# Patient Record
Sex: MALE | Race: BLACK OR AFRICAN AMERICAN | HISPANIC OR LATINO | Employment: OTHER | URBAN - METROPOLITAN AREA
[De-identification: names, ages, dates, MRNs, and addresses within clinical notes are randomized per-mention and may not be internally consistent; named-entity substitution may affect disease eponyms.]

---

## 2021-06-02 ENCOUNTER — RECORDS - HEALTHEAST (OUTPATIENT)
Dept: ADMINISTRATIVE | Facility: CLINIC | Age: 61
End: 2021-06-02

## 2023-09-22 ENCOUNTER — HOSPITAL ENCOUNTER (OUTPATIENT)
Facility: HOSPITAL | Age: 63
Setting detail: OBSERVATION
Discharge: HOME | End: 2023-09-24
Attending: EMERGENCY MEDICINE | Admitting: INTERNAL MEDICINE
Payer: MEDICARE

## 2023-09-22 ENCOUNTER — APPOINTMENT (OUTPATIENT)
Dept: RADIOLOGY | Facility: HOSPITAL | Age: 63
End: 2023-09-22
Attending: EMERGENCY MEDICINE
Payer: MEDICARE

## 2023-09-22 ENCOUNTER — APPOINTMENT (OUTPATIENT)
Dept: CT IMAGING | Facility: HOSPITAL | Age: 63
End: 2023-09-22
Attending: EMERGENCY MEDICINE
Payer: MEDICARE

## 2023-09-22 DIAGNOSIS — S22.42XA CLOSED FRACTURE OF MULTIPLE RIBS OF LEFT SIDE, INITIAL ENCOUNTER: ICD-10-CM

## 2023-09-22 DIAGNOSIS — S60.012A CONTUSION OF LEFT THUMB WITHOUT DAMAGE TO NAIL, INITIAL ENCOUNTER: ICD-10-CM
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
## 2023-09-22 DIAGNOSIS — E11.69 TYPE 2 DIABETES MELLITUS WITH OTHER SPECIFIED COMPLICATION, WITHOUT LONG-TERM CURRENT USE OF INSULIN (H): Primary | ICD-10-CM

## 2023-09-22 LAB
ABO/RH(D): NORMAL
ANION GAP SERPL CALCULATED.3IONS-SCNC: 14 MMOL/L (ref 7–15)
ANTIBODY SCREEN: NEGATIVE
APTT PPP: 22 SECONDS (ref 22–38)
BASOPHILS # BLD AUTO: 0.1 10E3/UL (ref 0–0.2)
BASOPHILS NFR BLD AUTO: 1 %
BUN SERPL-MCNC: 22.9 MG/DL (ref 8–23)
CALCIUM SERPL-MCNC: 9.9 MG/DL (ref 8.8–10.2)
CHLORIDE SERPL-SCNC: 101 MMOL/L (ref 98–107)
CREAT SERPL-MCNC: 1.4 MG/DL (ref 0.67–1.17)
DEPRECATED HCO3 PLAS-SCNC: 22 MMOL/L (ref 22–29)
EGFRCR SERPLBLD CKD-EPI 2021: 56 ML/MIN/1.73M2
EOSINOPHIL # BLD AUTO: 0.1 10E3/UL (ref 0–0.7)
EOSINOPHIL NFR BLD AUTO: 1 %
ERYTHROCYTE [DISTWIDTH] IN BLOOD BY AUTOMATED COUNT: 13.3 % (ref 10–15)
GLUCOSE BLDC GLUCOMTR-MCNC: 165 MG/DL (ref 70–99)
GLUCOSE SERPL-MCNC: 140 MG/DL (ref 70–99)
HBA1C MFR BLD: 6.9 %
HCT VFR BLD AUTO: 42.5 % (ref 40–53)
HGB BLD-MCNC: 14.6 G/DL (ref 13.3–17.7)
IMM GRANULOCYTES # BLD: 0.1 10E3/UL
IMM GRANULOCYTES NFR BLD: 1 %
INR PPP: 1.04 (ref 0.85–1.15)
LACTATE SERPL-SCNC: 1.2 MMOL/L (ref 0.7–2)
LYMPHOCYTES # BLD AUTO: 1.9 10E3/UL (ref 0.8–5.3)
LYMPHOCYTES NFR BLD AUTO: 13 %
MAGNESIUM SERPL-MCNC: 1.7 MG/DL (ref 1.7–2.3)
MCH RBC QN AUTO: 29.1 PG (ref 26.5–33)
MCHC RBC AUTO-ENTMCNC: 34.4 G/DL (ref 31.5–36.5)
MCV RBC AUTO: 85 FL (ref 78–100)
MONOCYTES # BLD AUTO: 0.9 10E3/UL (ref 0–1.3)
MONOCYTES NFR BLD AUTO: 6 %
NEUTROPHILS # BLD AUTO: 11.1 10E3/UL (ref 1.6–8.3)
NEUTROPHILS NFR BLD AUTO: 78 %
NRBC # BLD AUTO: 0 10E3/UL
NRBC BLD AUTO-RTO: 0 /100
PLATELET # BLD AUTO: 288 10E3/UL (ref 150–450)
POTASSIUM SERPL-SCNC: 4.1 MMOL/L (ref 3.4–5.3)
RBC # BLD AUTO: 5.02 10E6/UL (ref 4.4–5.9)
SODIUM SERPL-SCNC: 137 MMOL/L (ref 136–145)
SPECIMEN EXPIRATION DATE: NORMAL
WBC # BLD AUTO: 14.1 10E3/UL (ref 4–11)

## 2023-09-22 PROCEDURE — G0378 HOSPITAL OBSERVATION PER HR: HCPCS

## 2023-09-22 PROCEDURE — 82962 GLUCOSE BLOOD TEST: CPT

## 2023-09-22 PROCEDURE — 72125 CT NECK SPINE W/O DYE: CPT | Mod: MF

## 2023-09-22 PROCEDURE — 96375 TX/PRO/DX INJ NEW DRUG ADDON: CPT | Mod: 59

## 2023-09-22 PROCEDURE — 73140 X-RAY EXAM OF FINGER(S): CPT | Mod: LT

## 2023-09-22 PROCEDURE — 99222 1ST HOSP IP/OBS MODERATE 55: CPT | Mod: AI | Performed by: INTERNAL MEDICINE

## 2023-09-22 PROCEDURE — 258N000003 HC RX IP 258 OP 636: Performed by: INTERNAL MEDICINE

## 2023-09-22 PROCEDURE — 250N000011 HC RX IP 250 OP 636: Mod: JZ | Performed by: INTERNAL MEDICINE

## 2023-09-22 PROCEDURE — 250N000011 HC RX IP 250 OP 636: Performed by: EMERGENCY MEDICINE

## 2023-09-22 PROCEDURE — 96374 THER/PROPH/DIAG INJ IV PUSH: CPT

## 2023-09-22 PROCEDURE — 83605 ASSAY OF LACTIC ACID: CPT | Performed by: INTERNAL MEDICINE

## 2023-09-22 PROCEDURE — 250N000012 HC RX MED GY IP 250 OP 636 PS 637: Performed by: INTERNAL MEDICINE

## 2023-09-22 PROCEDURE — 71046 X-RAY EXAM CHEST 2 VIEWS: CPT

## 2023-09-22 PROCEDURE — 86901 BLOOD TYPING SEROLOGIC RH(D): CPT | Performed by: EMERGENCY MEDICINE

## 2023-09-22 PROCEDURE — 85730 THROMBOPLASTIN TIME PARTIAL: CPT | Performed by: EMERGENCY MEDICINE

## 2023-09-22 PROCEDURE — 85610 PROTHROMBIN TIME: CPT | Performed by: EMERGENCY MEDICINE

## 2023-09-22 PROCEDURE — 80048 BASIC METABOLIC PNL TOTAL CA: CPT | Performed by: EMERGENCY MEDICINE

## 2023-09-22 PROCEDURE — 36415 COLL VENOUS BLD VENIPUNCTURE: CPT | Performed by: EMERGENCY MEDICINE

## 2023-09-22 PROCEDURE — G1010 CDSM STANSON: HCPCS

## 2023-09-22 PROCEDURE — 250N000011 HC RX IP 250 OP 636: Mod: JZ | Performed by: EMERGENCY MEDICINE

## 2023-09-22 PROCEDURE — 36415 COLL VENOUS BLD VENIPUNCTURE: CPT | Performed by: INTERNAL MEDICINE

## 2023-09-22 PROCEDURE — 83735 ASSAY OF MAGNESIUM: CPT | Performed by: EMERGENCY MEDICINE

## 2023-09-22 PROCEDURE — 250N000013 HC RX MED GY IP 250 OP 250 PS 637: Performed by: STUDENT IN AN ORGANIZED HEALTH CARE EDUCATION/TRAINING PROGRAM

## 2023-09-22 PROCEDURE — 74177 CT ABD & PELVIS W/CONTRAST: CPT | Mod: MG

## 2023-09-22 PROCEDURE — 85004 AUTOMATED DIFF WBC COUNT: CPT | Performed by: EMERGENCY MEDICINE

## 2023-09-22 PROCEDURE — 99285 EMERGENCY DEPT VISIT HI MDM: CPT | Mod: 25

## 2023-09-22 PROCEDURE — 250N000013 HC RX MED GY IP 250 OP 250 PS 637: Performed by: INTERNAL MEDICINE

## 2023-09-22 PROCEDURE — 83036 HEMOGLOBIN GLYCOSYLATED A1C: CPT | Performed by: INTERNAL MEDICINE

## 2023-09-22 RX ORDER — OXYBUTYNIN CHLORIDE 15 MG/1
15 TABLET, EXTENDED RELEASE ORAL DAILY
COMMUNITY
Start: 2023-09-01

## 2023-09-22 RX ORDER — POLYETHYLENE GLYCOL 3350 17 G/17G
17 POWDER, FOR SOLUTION ORAL DAILY PRN
Status: DISCONTINUED | OUTPATIENT
Start: 2023-09-22 | End: 2023-09-24 | Stop reason: HOSPADM

## 2023-09-22 RX ORDER — IOPAMIDOL 755 MG/ML
75 INJECTION, SOLUTION INTRAVASCULAR ONCE
Status: COMPLETED | OUTPATIENT
Start: 2023-09-22 | End: 2023-09-22

## 2023-09-22 RX ORDER — HYDRALAZINE HYDROCHLORIDE 20 MG/ML
10 INJECTION INTRAMUSCULAR; INTRAVENOUS EVERY 6 HOURS PRN
Status: DISCONTINUED | OUTPATIENT
Start: 2023-09-22 | End: 2023-09-24 | Stop reason: HOSPADM

## 2023-09-22 RX ORDER — DEXTROSE MONOHYDRATE 25 G/50ML
25-50 INJECTION, SOLUTION INTRAVENOUS
Status: DISCONTINUED | OUTPATIENT
Start: 2023-09-22 | End: 2023-09-24 | Stop reason: HOSPADM

## 2023-09-22 RX ORDER — METFORMIN HCL 500 MG
1000 TABLET, EXTENDED RELEASE 24 HR ORAL 2 TIMES DAILY WITH MEALS
Status: ON HOLD | COMMUNITY
Start: 2023-09-01 | End: 2023-09-24

## 2023-09-22 RX ORDER — HYDROMORPHONE HYDROCHLORIDE 1 MG/ML
0.5 INJECTION, SOLUTION INTRAMUSCULAR; INTRAVENOUS; SUBCUTANEOUS ONCE
Status: COMPLETED | OUTPATIENT
Start: 2023-09-22 | End: 2023-09-22

## 2023-09-22 RX ORDER — ONDANSETRON 4 MG/1
4 TABLET, ORALLY DISINTEGRATING ORAL EVERY 6 HOURS PRN
Status: DISCONTINUED | OUTPATIENT
Start: 2023-09-22 | End: 2023-09-24 | Stop reason: HOSPADM

## 2023-09-22 RX ORDER — ACETAMINOPHEN 325 MG/1
975 TABLET ORAL EVERY 8 HOURS
Status: DISCONTINUED | OUTPATIENT
Start: 2023-09-23 | End: 2023-09-24 | Stop reason: HOSPADM

## 2023-09-22 RX ORDER — PROCHLORPERAZINE 25 MG
25 SUPPOSITORY, RECTAL RECTAL EVERY 12 HOURS PRN
Status: DISCONTINUED | OUTPATIENT
Start: 2023-09-22 | End: 2023-09-24 | Stop reason: HOSPADM

## 2023-09-22 RX ORDER — MORPHINE SULFATE 4 MG/ML
4 INJECTION, SOLUTION INTRAMUSCULAR; INTRAVENOUS ONCE
Status: COMPLETED | OUTPATIENT
Start: 2023-09-22 | End: 2023-09-22

## 2023-09-22 RX ORDER — ACETAMINOPHEN 325 MG/1
325 TABLET ORAL ONCE
Status: DISCONTINUED | OUTPATIENT
Start: 2023-09-22 | End: 2023-09-22

## 2023-09-22 RX ORDER — AMOXICILLIN 250 MG
1 CAPSULE ORAL 2 TIMES DAILY
Status: DISCONTINUED | OUTPATIENT
Start: 2023-09-22 | End: 2023-09-24 | Stop reason: HOSPADM

## 2023-09-22 RX ORDER — ACETAMINOPHEN 500 MG
500-1000 TABLET ORAL EVERY 8 HOURS PRN
COMMUNITY
Start: 2022-08-19

## 2023-09-22 RX ORDER — ONDANSETRON 2 MG/ML
4 INJECTION INTRAMUSCULAR; INTRAVENOUS EVERY 6 HOURS PRN
Status: DISCONTINUED | OUTPATIENT
Start: 2023-09-22 | End: 2023-09-24 | Stop reason: HOSPADM

## 2023-09-22 RX ORDER — NICOTINE POLACRILEX 4 MG
15-30 LOZENGE BUCCAL
Status: DISCONTINUED | OUTPATIENT
Start: 2023-09-22 | End: 2023-09-24 | Stop reason: HOSPADM

## 2023-09-22 RX ORDER — BISACODYL 10 MG
10 SUPPOSITORY, RECTAL RECTAL DAILY PRN
Status: DISCONTINUED | OUTPATIENT
Start: 2023-09-22 | End: 2023-09-24 | Stop reason: HOSPADM

## 2023-09-22 RX ORDER — LISINOPRIL 5 MG/1
1 TABLET ORAL DAILY
Status: ON HOLD | COMMUNITY
Start: 2023-09-01 | End: 2023-09-24

## 2023-09-22 RX ORDER — IBUPROFEN 600 MG/1
600 TABLET, FILM COATED ORAL ONCE
Status: DISCONTINUED | OUTPATIENT
Start: 2023-09-22 | End: 2023-09-22

## 2023-09-22 RX ORDER — ACETAMINOPHEN 325 MG/1
975 TABLET ORAL ONCE
Status: COMPLETED | OUTPATIENT
Start: 2023-09-22 | End: 2023-09-22

## 2023-09-22 RX ORDER — AMOXICILLIN 250 MG
2 CAPSULE ORAL 2 TIMES DAILY
Status: DISCONTINUED | OUTPATIENT
Start: 2023-09-22 | End: 2023-09-24 | Stop reason: HOSPADM

## 2023-09-22 RX ORDER — LIDOCAINE 4 G/G
1 PATCH TOPICAL
Status: DISCONTINUED | OUTPATIENT
Start: 2023-09-22 | End: 2023-09-23

## 2023-09-22 RX ORDER — ROSUVASTATIN CALCIUM 5 MG/1
5 TABLET, COATED ORAL
COMMUNITY
Start: 2023-09-01

## 2023-09-22 RX ORDER — SODIUM CHLORIDE 9 MG/ML
INJECTION, SOLUTION INTRAVENOUS CONTINUOUS
Status: DISCONTINUED | OUTPATIENT
Start: 2023-09-22 | End: 2023-09-23

## 2023-09-22 RX ORDER — PROCHLORPERAZINE MALEATE 10 MG
10 TABLET ORAL EVERY 6 HOURS PRN
Status: DISCONTINUED | OUTPATIENT
Start: 2023-09-22 | End: 2023-09-24 | Stop reason: HOSPADM

## 2023-09-22 RX ORDER — ALBUTEROL SULFATE 90 UG/1
1-2 AEROSOL, METERED RESPIRATORY (INHALATION) EVERY 4 HOURS PRN
COMMUNITY
Start: 2021-12-17

## 2023-09-22 RX ADMIN — SODIUM CHLORIDE, PRESERVATIVE FREE: 5 INJECTION INTRAVENOUS at 21:07

## 2023-09-22 RX ADMIN — LIDOCAINE 1 PATCH: 4 PATCH TOPICAL at 22:51

## 2023-09-22 RX ADMIN — IOPAMIDOL 75 ML: 755 INJECTION, SOLUTION INTRAVENOUS at 18:38

## 2023-09-22 RX ADMIN — OXYCODONE HYDROCHLORIDE 2.5 MG: 5 TABLET ORAL at 22:26

## 2023-09-22 RX ADMIN — ACETAMINOPHEN 975 MG: 325 TABLET ORAL at 16:30

## 2023-09-22 RX ADMIN — ONDANSETRON 4 MG: 2 INJECTION INTRAMUSCULAR; INTRAVENOUS at 21:08

## 2023-09-22 RX ADMIN — MORPHINE SULFATE 4 MG: 4 INJECTION, SOLUTION INTRAMUSCULAR; INTRAVENOUS at 17:27

## 2023-09-22 RX ADMIN — HYDROMORPHONE HYDROCHLORIDE 0.5 MG: 1 INJECTION, SOLUTION INTRAMUSCULAR; INTRAVENOUS; SUBCUTANEOUS at 18:50

## 2023-09-22 RX ADMIN — INSULIN GLARGINE 10 UNITS: 100 INJECTION, SOLUTION SUBCUTANEOUS at 22:53

## 2023-09-22 ASSESSMENT — ACTIVITIES OF DAILY LIVING (ADL)
ADLS_ACUITY_SCORE: 36
ADLS_ACUITY_SCORE: 35

## 2023-09-22 NOTE — ED PROVIDER NOTES
EMERGENCY DEPARTMENT ENCOUNTER      NAME: Josias Khan  AGE: 62 year old male  YOB: 1960  MRN: [id number]  EVALUATION DATE & TIME: 9/22/2023  4:37 PM    PCP: John Gonzales    ED PROVIDER: Jenny Skinner M.D.      Chief Complaint   Patient presents with    Fall     FINAL IMPRESSION:  1. Closed fracture of multiple ribs of left side, initial encounter    2. Fall, initial encounter    3. Contusion of left thumb without damage to nail, initial encounter      ED COURSE & MEDICAL DECISION MAKING:    Pertinent Labs & Imaging studies reviewed. (See chart for details)  ED Course as of 09/22/23 2226   Fri Sep 22, 2023   1718 Patient is a 62-year-old male comes in today for evaluation after a fall.  He was standing on a ladder and fell and landed on the left side against a countertop.  He has some pain with inspiration.  He is quite tender the left lateral ribs and to the left side of the abdomen.  He was initially seen sent for a chest x-ray before my evaluation but after evaluating him I think he needs to go down for CT of his chest and abdomen.  He is also complaining of some neck pain we will get a CT scan of his neck.  He did not think that he hit his head.  He would like something stronger for pain.  He already got some Tylenol.  I have ordered some morphine.  We will get some basic labs and then get him down for imaging.  He is not on any blood thinners but I am concerned about a traumatic injury.  Him and family are in agreement with the plan.   1723 Patient has some swelling and bruising to his left thumb as well so I have ordered an x-ray of that.   1920 Mildly displaced fractures of ribs 8 through 12.   1935 I discussed the results with the patient.  He would like admission to the hospital for pain control which I think is very reasonable given the number of rib fractures.  I will put a call out to the hospitalist.   1958 Spoke with Dr. Ji with the hospitalist service.  He agreed with plan  for a [facility] observation admission for 5 rib fractures.     5:15 PM I introduced myself to the patient, obtained patient history, performed a physical exam, and discussed plan for ED workup including potential diagnostic laboratory/imaging studies and interventions.  7:32 PM I rechecked and updated the patient.  7:36 PM I paged the hospitalist.  7:57 PM I spoke with the hospitalist, Dr. Ji, who has accepted the patient.    Medical Decision Making    History:  Supplemental history from: Patient's family   External Record(s) reviewed: None    Work Up:  Emergent/Severe conditions considered and evaluated for: Splint rib fractures, pneumothorax, hemothorax, splenic laceration, renal laceration, intra-abdominal hemorrhage  I independently reviewed and interpreted CT scan of the abdomen pelvis which did not show any significant splenic laceration.  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   All questions were answered.  The patient acknowledged understanding and was involved in the decision making regarding the overall care plan.      MEDICATIONS GIVEN IN THE EMERGENCY:  Medications   melatonin tablet 1 mg (has no administration in time range)   ondansetron (ZOFRAN ODT) ODT tab 4 mg ( Oral See Alternative 9/22/23 4864)     Or   ondansetron (ZOFRAN) injection 4 mg (4 mg Intravenous $Given  9/22/23 2108)   glucose gel 15-30 g (has no administration in time range)     Or   dextrose 50 % injection 25-50 mL (has no administration in time range)     Or   glucagon injection 1 mg (has no administration in time range)   acetaminophen (TYLENOL) tablet 975 mg (has no administration in time range)   oxyCODONE IR (ROXICODONE) half-tab 2.5 mg (has no administration in time range)   senna-docusate (SENOKOT-S/PERICOLACE) 8.6-50 MG per tablet 1 tablet (has no administration in time range)     Or   senna-docusate (SENOKOT-S/PERICOLACE) 8.6-50 MG per tablet 2 tablet (has no administration in time range)   polyethylene glycol (MIRALAX) Packet 17 g (has no administration in time range)   bisacodyl (DULCOLAX) suppository 10 mg (has no administration in time range)   insulin aspart (NovoLOG) injection (RAPID ACTING) (has no administration in time range)   insulin aspart (NovoLOG) injection (RAPID ACTING) (has no administration in time range)   prochlorperazine (COMPAZINE) injection 10 mg (has no administration in time range)     Or   prochlorperazine (COMPAZINE) tablet 10 mg (has no administration in time range)     Or   prochlorperazine (COMPAZINE) suppository 25 mg (has no administration in time range)   insulin glargine (LANTUS PEN) injection 10 Units (has no administration in time range)   Lidocaine (LIDOCARE) 4 % Patch 1 patch (has no administration in time range)   sodium chloride 0.9% infusion ( Intravenous $New Bag 9/22/23 2107)   hydrALAZINE (APRESOLINE) injection 10 mg (has no administration in time range)   acetaminophen (TYLENOL) tablet 975 mg (975 mg Oral $Given 9/22/23 1630)   morphine (PF) injection 4 mg (4 mg Intravenous $Given 9/22/23 1727)   iopamidol (ISOVUE-370) solution 75 mL (75 mLs Intravenous $Given 9/22/23 1838)   HYDROmorphone (PF) (DILAUDID) injection 0.5 mg (0.5 mg Intravenous $Given 9/22/23 1850)     =================================================================    HPI    Triage Note:  Patient Khmer speaking, here with daughter who is helping with language. Patient on ladder in kitchen, 3.5 feet up and trying to step over to counter, ladder fell over and he hit left abdomin on counter and fell to floor. No LOC, no head injury no blood thinner. Incident happen one hour ago.    Patient information was obtained from: Patient, Patient's family    Use of : Patient's family served as Azeri interpretor        Josias Khan is a 62 year old male who presents for evaluation of a fall. Patient had fallen off a ladder 4 ft above the ground and hit his ribs on the counter. Patient reports pain with breathing. Patient reports neck pain. His left thumb is bruised. Patient took tylenol because he can't take ibuprofen. He has type 2 diabetes. He is not on blood thinners.     PAST MEDICAL HISTORY:  No past medical history on file.    PAST SURGICAL HISTORY:  No past surgical history on file.    CURRENT MEDICATIONS:      Current Facility-Administered Medications:     [START ON 9/23/2023] acetaminophen (TYLENOL) tablet 975 mg, 975 mg, Oral, Q8H, Isidro Ji MD    bisacodyl (DULCOLAX) suppository 10 mg, 10 mg, Rectal, Daily PRN, Isidro Ji MD    glucose gel 15-30 g, 15-30 g, Oral, Q15 Min PRN **OR** dextrose 50 % injection 25-50 mL, 25-50 mL, Intravenous, Q15 Min PRN **OR** glucagon injection 1 mg, 1 mg, Subcutaneous, Q15 Min PRN, Isidro Ji MD    hydrALAZINE (APRESOLINE) injection 10 mg, 10 mg, Intravenous, Q6H PRN, Isidro Ji MD    [START ON 9/23/2023] insulin aspart (NovoLOG) injection (RAPID ACTING), 1-7 Units, Subcutaneous, TID AC, Isidro Ji MD    insulin aspart (NovoLOG) injection (RAPID ACTING), 1-5 Units, Subcutaneous, At Bedtime, Isidro Ji MD    insulin glargine (LANTUS PEN) injection 10 Units, 10 Units, Subcutaneous, At Bedtime, Isidro Ji MD    Lidocaine (LIDOCARE) 4 % Patch 1 patch, 1 patch, Transdermal, Q24h, Isidro Ji MD    melatonin tablet 1  "mg, 1 mg, Oral, At Bedtime PRN, Isidro Ji MD    ondansetron (ZOFRAN ODT) ODT tab 4 mg, 4 mg, Oral, Q6H PRN **OR** ondansetron (ZOFRAN) injection 4 mg, 4 mg, Intravenous, Q6H PRN, Isidro Ji MD, 4 mg at 09/22/23 2108    oxyCODONE IR (ROXICODONE) half-tab 2.5 mg, 2.5 mg, Oral, Q4H PRN, Isidro Ji MD    polyethylene glycol (MIRALAX) Packet 17 g, 17 g, Oral, Daily PRN, Isidro Ji MD    prochlorperazine (COMPAZINE) injection 10 mg, 10 mg, Intravenous, Q6H PRN **OR** prochlorperazine (COMPAZINE) tablet 10 mg, 10 mg, Oral, Q6H PRN **OR** prochlorperazine (COMPAZINE) suppository 25 mg, 25 mg, Rectal, Q12H PRN, Isidro Ji MD    senna-docusate (SENOKOT-S/PERICOLACE) 8.6-50 MG per tablet 1 tablet, 1 tablet, Oral, BID **OR** senna-docusate (SENOKOT-S/PERICOLACE) 8.6-50 MG per tablet 2 tablet, 2 tablet, Oral, BID, Isidro Ji MD    sodium chloride 0.9% infusion, , Intravenous, Continuous, Isidro Ji MD, Last Rate: 100 mL/hr at 09/22/23 2107, New Bag at 09/22/23 2107    ALLERGIES:  Allergies   Allergen Reactions    Ibuprofen Other (See Comments)     Pt reports advised to avoid by pcp r/t long term use leading to kidney impairment.       FAMILY HISTORY:  No family history on file.    SOCIAL HISTORY:   Social History     Socioeconomic History    Marital status:        PHYSICAL EXAM    VITAL SIGNS: BP (!) 142/82 (BP Location: Left arm)   Pulse 57   Temp 98  F (36.7  C) (Oral)   Resp 18   Ht 1.727 m (5' 8\")   Wt 84.9 kg (187 lb 2.7 oz)   SpO2 93%   BMI 28.46 kg/m     GENERAL: Awake, alert, answering questions appropriately, Appears uncomfortable  SPEECH:  Easy to understand speech, Normal volume and kylie  PULMONARY: No respiratory distress, Lungs clear to auscultation bilaterally  CARDIOVASCULAR: Regular rate and rhythm, Distal pulses present and normal.  ABDOMINAL: Soft, Nondistended, No rebound or guarding, No palpable masses, Tenderness to left-sided abdomen, Tenderness to left " lateral ribs, Tenderness to cervical spine.  EXTREMITIES: No lower extremity edema, Swelling and bruising to his left thumb  PSYCH: Normal mood and affect     LAB:  All pertinent labs reviewed and interpreted.  Results for orders placed or performed during the hospital encounter of 09/22/23   XR Chest 2 Views    Impression    IMPRESSION: Shallow inspiration. No focal pulmonary consolidation. No pleural effusion. No pneumothorax. Heart size and pulmonary vascularity within normal limits.    Cervical spine CT w/o contrast    Impression    IMPRESSION:  1.  No fracture or posttraumatic subluxation.     CT Chest/Abdomen/Pelvis w Contrast    Impression    IMPRESSION:  1.  Mildly displaced fractures of the left 8th through 12th ribs. No significant pleural effusion or pneumothorax.  2.  No evidence of acute trauma in the abdomen or pelvis.  3.  Hepatic steatosis.   XR Finger Left G/E 2 Views    Impression    IMPRESSION: The left thumb is negative for fracture or dislocation. Mild degenerative change at the IP joint of the left thumb.     Result Value Ref Range    INR 1.04 0.85 - 1.15   Partial thromboplastin time   Result Value Ref Range    aPTT 22 22 - 38 Seconds   Basic metabolic panel   Result Value Ref Range    Sodium 137 136 - 145 mmol/L    Potassium 4.1 3.4 - 5.3 mmol/L    Chloride 101 98 - 107 mmol/L    Carbon Dioxide (CO2) 22 22 - 29 mmol/L    Anion Gap 14 7 - 15 mmol/L    Urea Nitrogen 22.9 8.0 - 23.0 mg/dL    Creatinine 1.40 (H) 0.67 - 1.17 mg/dL    Calcium 9.9 8.8 - 10.2 mg/dL    Glucose 140 (H) 70 - 99 mg/dL    GFR Estimate 56 (L) >60 mL/min/1.73m2   Result Value Ref Range    Magnesium 1.7 1.7 - 2.3 mg/dL   CBC with platelets and differential   Result Value Ref Range    WBC Count 14.1 (H) 4.0 - 11.0 10e3/uL    RBC Count 5.02 4.40 - 5.90 10e6/uL    Hemoglobin 14.6 13.3 - 17.7 g/dL    Hematocrit 42.5 40.0 - 53.0 %    MCV 85 78 - 100 fL    MCH 29.1 26.5 - 33.0 pg    MCHC 34.4 31.5 - 36.5 g/dL    RDW 13.3 10.0 -  15.0 %    Platelet Count 288 150 - 450 10e3/uL    % Neutrophils 78 %    % Lymphocytes 13 %    % Monocytes 6 %    % Eosinophils 1 %    % Basophils 1 %    % Immature Granulocytes 1 %    NRBCs per 100 WBC 0 <1 /100    Absolute Neutrophils 11.1 (H) 1.6 - 8.3 10e3/uL    Absolute Lymphocytes 1.9 0.8 - 5.3 10e3/uL    Absolute Monocytes 0.9 0.0 - 1.3 10e3/uL    Absolute Eosinophils 0.1 0.0 - 0.7 10e3/uL    Absolute Basophils 0.1 0.0 - 0.2 10e3/uL    Absolute Immature Granulocytes 0.1 <=0.4 10e3/uL    Absolute NRBCs 0.0 10e3/uL   Result Value Ref Range    Hemoglobin A1C 6.9 (H) <5.7 %   Lactic acid whole blood   Result Value Ref Range    Lactic Acid 1.2 0.7 - 2.0 mmol/L   Adult Type and Screen   Result Value Ref Range    ABO/RH(D) A POS     Antibody Screen Negative Negative    SPECIMEN EXPIRATION DATE [id number]        RADIOLOGY:  XR Finger Left G/E 2 Views   Final Result   IMPRESSION: The left thumb is negative for fracture or dislocation. Mild degenerative change at the IP joint of the left thumb.         Cervical spine CT w/o contrast   Final Result   IMPRESSION:   1.  No fracture or posttraumatic subluxation.         CT Chest/Abdomen/Pelvis w Contrast   Final Result   IMPRESSION:   1.  Mildly displaced fractures of the left 8th through 12th ribs. No significant pleural effusion or pneumothorax.   2.  No evidence of acute trauma in the abdomen or pelvis.   3.  Hepatic steatosis.      XR Chest 2 Views   Final Result   IMPRESSION: Shallow inspiration. No focal pulmonary consolidation. No pleural effusion. No pneumothorax. Heart size and pulmonary vascularity within normal limits.             I, Lin Fletcher, am serving as a scribe to document services personally performed by Dr. Skinner based on my observation and the provider's statements to me. I, Jenny Skinner MD attest that Lin Fletcher is acting in a scribe capacity, has observed my performance of the services and has documented them in accordance with my  direction.    Jenny Skinner M.D.  Emergency Medicine  [facility] EMERGENCY DEPARTMENT  [facility] [facility] [id number] [phone]  Dept: [phone]       Jenny Skinner MD  09/22/23 1576

## 2023-09-22 NOTE — ED TRIAGE NOTES
Patient Armenian speaking, here with daughter who is helping with language. Patient on ladder in kitchen, 3.5 feet up and trying to step over to counter, ladder fell over and he hit left abdomin on counter and fell to floor. No LOC, no head injury no blood thinner. Incident happen one hour ago.

## 2023-09-23 ENCOUNTER — APPOINTMENT (OUTPATIENT)
Dept: PHYSICAL THERAPY | Facility: HOSPITAL | Age: 63
End: 2023-09-23
Attending: INTERNAL MEDICINE
Payer: MEDICARE

## 2023-09-23 LAB
ANION GAP SERPL CALCULATED.3IONS-SCNC: 10 MMOL/L (ref 7–15)
BUN SERPL-MCNC: 18.3 MG/DL (ref 8–23)
CALCIUM SERPL-MCNC: 8.5 MG/DL (ref 8.8–10.2)
CHLORIDE SERPL-SCNC: 104 MMOL/L (ref 98–107)
CK SERPL-CCNC: 322 U/L (ref 39–308)
CREAT SERPL-MCNC: 1.13 MG/DL (ref 0.67–1.17)
DEPRECATED HCO3 PLAS-SCNC: 23 MMOL/L (ref 22–29)
EGFRCR SERPLBLD CKD-EPI 2021: 73 ML/MIN/1.73M2
ERYTHROCYTE [DISTWIDTH] IN BLOOD BY AUTOMATED COUNT: 13.4 % (ref 10–15)
GLUCOSE BLDC GLUCOMTR-MCNC: 109 MG/DL (ref 70–99)
GLUCOSE BLDC GLUCOMTR-MCNC: 113 MG/DL (ref 70–99)
GLUCOSE BLDC GLUCOMTR-MCNC: 117 MG/DL (ref 70–99)
GLUCOSE BLDC GLUCOMTR-MCNC: 125 MG/DL (ref 70–99)
GLUCOSE BLDC GLUCOMTR-MCNC: 127 MG/DL (ref 70–99)
GLUCOSE SERPL-MCNC: 114 MG/DL (ref 70–99)
HCT VFR BLD AUTO: 38.4 % (ref 40–53)
HGB BLD-MCNC: 13.4 G/DL (ref 13.3–17.7)
MCH RBC QN AUTO: 29.3 PG (ref 26.5–33)
MCHC RBC AUTO-ENTMCNC: 34.9 G/DL (ref 31.5–36.5)
MCV RBC AUTO: 84 FL (ref 78–100)
PLATELET # BLD AUTO: 270 10E3/UL (ref 150–450)
POTASSIUM SERPL-SCNC: 4 MMOL/L (ref 3.4–5.3)
RBC # BLD AUTO: 4.57 10E6/UL (ref 4.4–5.9)
SODIUM SERPL-SCNC: 137 MMOL/L (ref 136–145)
WBC # BLD AUTO: 9 10E3/UL (ref 4–11)

## 2023-09-23 PROCEDURE — 250N000013 HC RX MED GY IP 250 OP 250 PS 637: Performed by: INTERNAL MEDICINE

## 2023-09-23 PROCEDURE — 97116 GAIT TRAINING THERAPY: CPT | Mod: GP

## 2023-09-23 PROCEDURE — 82962 GLUCOSE BLOOD TEST: CPT

## 2023-09-23 PROCEDURE — 99207 PR NO BILLABLE SERVICE THIS VISIT: CPT | Performed by: PHYSICIAN ASSISTANT

## 2023-09-23 PROCEDURE — 250N000009 HC RX 250: Performed by: INTERNAL MEDICINE

## 2023-09-23 PROCEDURE — 97161 PT EVAL LOW COMPLEX 20 MIN: CPT | Mod: GP

## 2023-09-23 PROCEDURE — G0378 HOSPITAL OBSERVATION PER HR: HCPCS

## 2023-09-23 PROCEDURE — 99232 SBSQ HOSP IP/OBS MODERATE 35: CPT | Performed by: INTERNAL MEDICINE

## 2023-09-23 PROCEDURE — 36415 COLL VENOUS BLD VENIPUNCTURE: CPT | Performed by: INTERNAL MEDICINE

## 2023-09-23 PROCEDURE — 82550 ASSAY OF CK (CPK): CPT | Performed by: INTERNAL MEDICINE

## 2023-09-23 PROCEDURE — 80048 BASIC METABOLIC PNL TOTAL CA: CPT | Performed by: INTERNAL MEDICINE

## 2023-09-23 PROCEDURE — 85027 COMPLETE CBC AUTOMATED: CPT | Performed by: INTERNAL MEDICINE

## 2023-09-23 RX ORDER — LISINOPRIL 5 MG/1
5 TABLET ORAL DAILY
Status: DISCONTINUED | OUTPATIENT
Start: 2023-09-23 | End: 2023-09-24 | Stop reason: HOSPADM

## 2023-09-23 RX ORDER — NALOXONE HYDROCHLORIDE 0.4 MG/ML
0.4 INJECTION, SOLUTION INTRAMUSCULAR; INTRAVENOUS; SUBCUTANEOUS
Status: DISCONTINUED | OUTPATIENT
Start: 2023-09-23 | End: 2023-09-24 | Stop reason: HOSPADM

## 2023-09-23 RX ORDER — NALOXONE HYDROCHLORIDE 0.4 MG/ML
0.2 INJECTION, SOLUTION INTRAMUSCULAR; INTRAVENOUS; SUBCUTANEOUS
Status: DISCONTINUED | OUTPATIENT
Start: 2023-09-23 | End: 2023-09-24 | Stop reason: HOSPADM

## 2023-09-23 RX ORDER — LIDOCAINE 50 MG/G
OINTMENT TOPICAL 4 TIMES DAILY
Status: DISCONTINUED | OUTPATIENT
Start: 2023-09-23 | End: 2023-09-24 | Stop reason: HOSPADM

## 2023-09-23 RX ORDER — ALBUTEROL SULFATE 90 UG/1
1-2 AEROSOL, METERED RESPIRATORY (INHALATION) EVERY 4 HOURS PRN
Status: DISCONTINUED | OUTPATIENT
Start: 2023-09-23 | End: 2023-09-24 | Stop reason: HOSPADM

## 2023-09-23 RX ADMIN — LIDOCAINE: 50 OINTMENT TOPICAL at 17:46

## 2023-09-23 RX ADMIN — LIDOCAINE: 50 OINTMENT TOPICAL at 13:11

## 2023-09-23 RX ADMIN — LIDOCAINE: 50 OINTMENT TOPICAL at 10:31

## 2023-09-23 RX ADMIN — LIDOCAINE: 50 OINTMENT TOPICAL at 21:42

## 2023-09-23 RX ADMIN — ACETAMINOPHEN 975 MG: 325 TABLET ORAL at 08:24

## 2023-09-23 RX ADMIN — ACETAMINOPHEN 975 MG: 325 TABLET ORAL at 01:23

## 2023-09-23 RX ADMIN — OXYCODONE HYDROCHLORIDE 2.5 MG: 5 TABLET ORAL at 21:41

## 2023-09-23 RX ADMIN — SENNOSIDES AND DOCUSATE SODIUM 1 TABLET: 50; 8.6 TABLET ORAL at 21:42

## 2023-09-23 RX ADMIN — ACETAMINOPHEN 975 MG: 325 TABLET ORAL at 17:46

## 2023-09-23 RX ADMIN — SENNOSIDES AND DOCUSATE SODIUM 1 TABLET: 50; 8.6 TABLET ORAL at 08:22

## 2023-09-23 ASSESSMENT — ACTIVITIES OF DAILY LIVING (ADL)
ADLS_ACUITY_SCORE: 32
ADLS_ACUITY_SCORE: 36
ADLS_ACUITY_SCORE: 32
ADLS_ACUITY_SCORE: 36
ADLS_ACUITY_SCORE: 32

## 2023-09-23 NOTE — PLAN OF CARE
"Goal Outcome Evaluation:      Plan of Care Reviewed With: patient    Overall Patient Progress: improvingOverall Patient Progress: improving    Outcome Evaluation: Pt alert and oriented. Pain rated 3/10, given oxy X1 overnight. NS at 100 mL/hr. LUE movement moderately limited, did not get up out of bed. VSS. Slept between cares.    /75 (BP Location: Left arm)   Pulse 57   Temp 97.9  F (36.6  C) (Oral)   Resp 16   Ht 1.727 m (5' 8\")   Wt 84.9 kg (187 lb 2.7 oz)   SpO2 93%   BMI 28.46 kg/m      Anthony Coello RN    "

## 2023-09-23 NOTE — PROGRESS NOTES
"[facility]    Medicine Progress Note - Hospitalist Service    Date of Admission:  9/22/2023    Assessment & Plan   Josias Khan is a 62 year old male admitted on 9/22/2023. He presents with fall off ladder. Found to have ribs fracture. PMH: DM on Metformin, HTN on Lisinopril and Statin for HLD.      Fall off ladder 4 f above ground  Left ribs fractures 8-12 mildly displaced, no PTX  -pt denies head/neck injury. No LOC.  -chest ct: Mildly displaced fractures of the left 8th through 12th ribs. No significant pleural effusion or pneumothorax   -pain control, bronchial hygiene  -trauma surgeon consultation appreciated  -mild left thumb injury with bruise; xray ok  -pt/ot  -per PT assessment TCU advised. OT consulted. SW will d/w them in a.m.  -fall precautions     EZIO or CKD, improved  -no previous record  -hold Lisinopril  -cmp a.m.     DM 2 on Metformin  -hold metformin due to EZIO  -stop Lantus   -sliding scale insulin ac tid, accuchecks ac/hs  -hold metformin  -hgb A1c     HTN  -hold Lisinopril today  -monitor     HLD     Neck discomfort  -CT showing no acute changes       Diet: Moderate Consistent Carb (60 g CHO per Meal) Diet    DVT Prophylaxis: Pneumatic Compression Devices  Tobar Catheter: Not present  Lines: None     Cardiac Monitoring: None  Code Status: Full Code      Clinically Significant Risk Factors Present on Admission                  # Hypertension: Home medication list includes antihypertensive(s)     # DMII: A1C = 6.9 % (Ref range: <5.7 %) within past 6 months    # Overweight: Estimated body mass index is 28.46 kg/m  as calculated from the following:    Height as of this encounter: 1.727 m (5' 8\").    Weight as of this encounter: 84.9 kg (187 lb 2.7 oz).              Disposition Plan      Expected Discharge Date: 09/24/2023                  Tushar Maguire DO, DO  Hospitalist Service  [facility]  Securely message with Printed Piece (more info)  Text " page via [employer] Paging/Directory   ______________________________________________________________________    Interval History   reviewed    Physical Exam   Vital Signs: Temp: 98.6  F (37  C) Temp src: Oral BP: 135/76 Pulse: 63   Resp: 18 SpO2: 93 % O2 Device: None (Room air)    Weight: 187 lbs 2.73 oz.  Physical Examination:   General appearance - alert, and in no distress  Eyes - pupils equal and reactive, extraocular eye movements intact, sclera anicteric  Mouth - mucous membranes moist, pharynx normal without lesions  Lungs - clear to auscultation, no wheezes, rales or rhonchi, symmetric air entry  Heart - normal rate, regular rhythm, normal S1, S2, no murmurs, rubs, clicks or gallops. No peripheral edema.  Abdomen - soft, nontender, nondistended, no masses or organomegaly, BS+  Neurological - alert, oriented, normal speech, no focal findings or movement disorder noted  Skin - left rib cage area near known fx's ttp    Lab/imaging reviewed

## 2023-09-23 NOTE — PROGRESS NOTES
Chart reviewed. Patient is a 61 yo Bulgarian speaking male admitted with rib fractures and thumb contusion following a fall off a ladder at home. He lives with family locally. He has insurance in place. MOON form reviewed and signed - English speaking family at bedside. Anticipate patient will discharge home via family transport with no needs. CM to follow should needs arise.    MEKA Michael

## 2023-09-23 NOTE — CONSULTS
Physician Attestation      I saw and evaluated Josias Khan as part of a shared APRN/PA visit.      I personally reviewed the vital signs, medications, labs, and imaging.  They show left-sided rib fractures     I personally performed the substantive portion of the medical decision making for this visit; we recommend pain management and deep breathing with incentive spirometry.    Please see the RONALDO's documentation for full details.    Key management decisions made by me and carried out under my direction:      I personally performed the substantive portion of the history for this visit - please see the RONALDO's documentation for full details.  Key additional history findings made by me:      Melvin Cordova MD  Date of Service (when I saw the patient): 09/23/23    General Surgery Consultation  Josias Khan MRN# [id number]   Age/Sex: 62 year old male YOB: 1960     Reason for consult: 1. Closed fracture of multiple ribs of left side, initial encounter    2. Fall, initial encounter    3. Contusion of left thumb without damage to nail, initial encounter            Requesting physician: Dr. Ji                   Assessment and Plan:   Assessment:  Left rib fxs after fall from ladder, no pnuemothorax    Plan:  Reviewed exam, lab, and imaging findings with them. Findings consistent with left rib fxs after fall from ladder. CT without any pneumothorax. IS to prevent pneumonia. Pain control.  No further needs from trauma standpoint, we will sign off. Ok to discharge once medically stable.       John Duque PA-C  [facility]  Surgery [address]  [place] [address]?  Office: [phone]             Chief Complaint:     Chief Complaint   Patient presents with    Fall        History is obtained from the patient    HPI:   Josias Khan is a 62 year old male who presents after falling from ladder yesterday. He presented to the ED. CT  showed left rib fxs without pneumothorax. He is doing well this AM. Pain tolerable. Denies SOB.           Past Medical History:   No past medical history on file.           Past Surgical History:   No past surgical history on file.          Social History:               Family History:   No family history on file.           Allergies:     Allergies   Allergen Reactions    Ibuprofen Other (See Comments)     Pt reports advised to avoid by pcp r/t long term use leading to kidney impairment.              Medications:     Prior to Admission medications    Medication Sig Start Date End Date Taking? Authorizing Provider   acetaminophen (TYLENOL) 500 MG tablet Take 500-1,000 mg by mouth every 8 hours as needed for pain 8/19/22  Yes Reported, Patient   albuterol (PROAIR HFA/PROVENTIL HFA/VENTOLIN HFA) 108 (90 Base) MCG/ACT inhaler Inhale 1-2 puffs into the lungs every 4 hours as needed for shortness of breath or wheezing 12/17/21  Yes Reported, Patient   lisinopril (ZESTRIL) 5 MG tablet Take 1 tablet by mouth daily 9/1/23  Yes Reported, Patient   metFORMIN (GLUCOPHAGE XR) 500 MG 24 hr tablet Take 1,000 mg by mouth 2 times daily (with meals) 9/1/23  Yes Reported, Patient   omeprazole (PRILOSEC) 20 MG DR capsule Take 20 mg by mouth daily as needed 9/1/23  Yes Reported, Patient   oxyBUTYnin ER (DITROPAN XL) 15 MG 24 hr tablet Take 15 mg by mouth daily 9/1/23  Yes Reported, Patient   rosuvastatin (CRESTOR) 5 MG tablet Take 5 mg by mouth every 7 days 9/1/23   Reported, Patient              Review of Systems:   The Review of Systems is negative other than noted in the HPI            Physical Exam:   Patient Vitals for the past 24 hrs:   BP Temp Temp src Pulse Resp SpO2 Height Weight   09/23/23 1149 133/61 98.2  F (36.8  C) Oral 61 18 93 % -- --   09/23/23 0739 129/77 97.8  F (36.6  C) Oral 55 16 96 % -- --   09/23/23 0313 125/75 97.9  F (36.6  C) Oral 57 16 93 % -- --   09/22/23 2320 127/71 98.7  F (37.1  C) Oral 59 16 94 % -- --  "  09/22/23 2153 (!) 142/82 98  F (36.7  C) Oral 57 18 93 % -- 84.9 kg (187 lb 2.7 oz)   09/22/23 2116 (!) 177/93 -- -- 71 -- 93 % -- --   09/22/23 2100 (!) 151/82 -- -- -- -- -- -- --   09/22/23 2030 (!) 185/108 -- -- 107 -- 96 % -- --   09/22/23 2015 (!) 145/87 -- -- 61 -- 94 % -- --   09/22/23 1930 (!) 149/88 -- -- 69 -- 93 % -- --   09/22/23 1856 (!) 140/81 -- -- 64 19 91 % -- --   09/22/23 1845 (!) 154/86 -- -- -- -- -- -- --   09/22/23 1800 138/82 -- -- 60 23 92 % -- --   09/22/23 1731 -- -- -- 65 -- 93 % -- --   09/22/23 1730 (!) 153/93 -- -- 69 -- 93 % -- --   09/22/23 1729 (!) 156/88 -- -- 68 -- 94 % -- --   09/22/23 1518 (!) 140/84 97.9  F (36.6  C) -- 60 20 95 % 1.727 m (5' 8\") 85.3 kg (188 lb)          Intake/Output Summary (Last 24 hours) at 9/23/2023 1332  Last data filed at 9/23/2023 1300  Gross per 24 hour   Intake 360 ml   Output 1550 ml   Net -1190 ml      Constitutional:   awake, alert, cooperative, no apparent distress, and appears stated age       Eyes:   PERRL, conjunctiva/corneas clear, EOM's intact; no scleral edema or icterus noted        ENT:   Normocephalic, without obvious abnormality, atraumatic, Lips, mucosa, and tongue normal        Hematologic / Lymphatic:   No lymphadenopathy       Lungs:   Normal respiratory effort, no accessory muscle use, ttp along left chest wall, no crepitus       Cardiovascular:   Regular rate and rhythm       Abdomen:   Soft, nontender       Musculoskeletal:   No obvious swelling, bruising or deformity       Skin:   Skin color and texture normal for patient, no rashes or lesions              Data:        Admission on 09/22/2023   Component Date Value    INR 09/22/2023 1.04     aPTT 09/22/2023 22     Sodium 09/22/2023 137     Potassium 09/22/2023 4.1     Chloride 09/22/2023 101     Carbon Dioxide (CO2) 09/22/2023 22     Anion Gap 09/22/2023 14     Urea Nitrogen 09/22/2023 22.9     Creatinine 09/22/2023 1.40 (H)     Calcium 09/22/2023 9.9     Glucose 09/22/2023 " 140 (H)     GFR Estimate 09/22/2023 56 (L)     Magnesium 09/22/2023 1.7     WBC Count 09/22/2023 14.1 (H)     RBC Count 09/22/2023 5.02     Hemoglobin 09/22/2023 14.6     Hematocrit 09/22/2023 42.5     MCV 09/22/2023 85     MCH 09/22/2023 29.1     MCHC 09/22/2023 34.4     RDW 09/22/2023 13.3     Platelet Count 09/22/2023 288     % Neutrophils 09/22/2023 78     % Lymphocytes 09/22/2023 13     % Monocytes 09/22/2023 6     % Eosinophils 09/22/2023 1     % Basophils 09/22/2023 1     % Immature Granulocytes 09/22/2023 1     NRBCs per 100 WBC 09/22/2023 0     Absolute Neutrophils 09/22/2023 11.1 (H)     Absolute Lymphocytes 09/22/2023 1.9     Absolute Monocytes 09/22/2023 0.9     Absolute Eosinophils 09/22/2023 0.1     Absolute Basophils 09/22/2023 0.1     Absolute Immature Granul* 09/22/2023 0.1     Absolute NRBCs 09/22/2023 0.0     ABO/RH(D) 09/22/2023 A POS     Antibody Screen 09/22/2023 Negative     SPECIMEN EXPIRATION DATE 09/22/2023 [id number]     Hemoglobin A1C 09/22/2023 6.9 (H)     Lactic Acid 09/22/2023 1.2     GLUCOSE BY METER POCT 09/22/2023 165 (H)     Sodium 09/23/2023 137     Potassium 09/23/2023 4.0     Chloride 09/23/2023 104     Carbon Dioxide (CO2) 09/23/2023 23     Anion Gap 09/23/2023 10     Urea Nitrogen 09/23/2023 18.3     Creatinine 09/23/2023 1.13     Calcium 09/23/2023 8.5 (L)     Glucose 09/23/2023 114 (H)     GFR Estimate 09/23/2023 73     WBC Count 09/23/2023 9.0     RBC Count 09/23/2023 4.57     Hemoglobin 09/23/2023 13.4     Hematocrit 09/23/2023 38.4 (L)     MCV 09/23/2023 84     MCH 09/23/2023 29.3     MCHC 09/23/2023 34.9     RDW 09/23/2023 13.4     Platelet Count 09/23/2023 270     GLUCOSE BY METER POCT 09/23/2023 117 (H)     GLUCOSE BY METER POCT 09/23/2023 109 (H)     CK 09/23/2023 322 (H)     GLUCOSE BY METER POCT 09/23/2023 125 (H)          All imaging studies reviewed by me.

## 2023-09-23 NOTE — PROGRESS NOTES
PRIMARY DIAGNOSIS: ACUTE PAIN  OUTPATIENT/OBSERVATION GOALS TO BE MET BEFORE DISCHARGE:  1. Pain Status: Improved-controlled with oral pain medications.    2. Return to near baseline physical activity: No    3. Cleared for discharge by consultants (if involved): No    Discharge Planner Nurse   Safe discharge environment identified: Yes  Barriers to discharge: Yes       Entered by: Mariah Coulter RN 09/23/2023 5:44 PM     Please review provider order for any additional goals.   Nurse to notify provider when observation goals have been met and patient is ready for discharge.

## 2023-09-23 NOTE — H&P
"[facility]    History and Physical - Hospitalist Service       Date of Admission:  9/22/2023    Assessment & Plan      Josias Khan is a 62 year old male admitted on 9/22/2023. He presents with fall off ladder. Found to have ribs fracture. PMH: DM on Metformin, HTN on Lisinopril and Statin for HLD.     Fall off ladder 4 f above ground  Left ribs fracture 8-12 mildly displaced, no PTX  -pt denies head/neck injury. No LOC.  -chest ct: Mildly displaced fractures of the left 8th through 12th ribs. No significant pleural effusion or pneumothorax   -pain control  -trauma surgeon consultation  -mild left thumb injury with bruise; xray ok    EZIO or CKD  -no previous record  -hold Lisinopril  -IVF and recheck BMP in am    DM 2 on Metformin  -hold metformin due to EZIO  -Lantus and sliding scale insulin  -hgb A1c    HTN  -hold Lisinopril due to EZIO    HLD  -PTA meds after review    Neck discomfort  -CT showing no acute changes     Diet:  regular  DVT Prophylaxis: Low Risk/Ambulatory with no VTE prophylaxis indicated  Tobar Catheter: Not present  Lines: None     Cardiac Monitoring: None  Code Status:  full    Clinically Significant Risk Factors Present on Admission                       # Overweight: Estimated body mass index is 28.59 kg/m  as calculated from the following:    Height as of this encounter: 1.727 m (5' 8\").    Weight as of this encounter: 85.3 kg (188 lb).              Disposition Plan        Within 2 days if pain in control       Isidro Ji MD  Hospitalist Service  [facility]  Securely message with [employer] (more info)  Text page via [employer] Paging/Directory     ______________________________________________________________________    Chief Complaint   fall    History is obtained from the patient    History of Present Illness   Josias Khan is a 62 year old male who presents for evaluation of a fall. Patient had fallen off a ladder 4 ft above the ground " and hit his ribs on the counter. Patient reports pain with breathing. Patient reports neck pain. His left thumb is bruised. Patient took tylenol because he can't take ibuprofen. He has type 2 diabetes. He is not on blood thinners.        Past Medical History    No past medical history on file.    Past Surgical History   No past surgical history on file.    Prior to Admission Medications   None        Allergies   Allergies   Allergen Reactions    Ibuprofen Other (See Comments)     Pt reports advised to avoid by pcp r/t long term use leading to kidney impairment.        Physical Exam   Vital Signs: Temp: 97.9  F (36.6  C)   BP: (!) 149/88 Pulse: 69   Resp: 19 SpO2: 93 % O2 Device: None (Room air)    Weight: 188 lbs 0 oz    General.  Awake alert oriented not in acute distress.  HEENT.  Pupils equal round react to light, anicteric, EOM intact.  Neck supple no JVD.  CVS regular rhythm no murmur gallops.  Left chest wall tendernss  Lungs.  Clear to auscultation bilateral no wheezing or rales.  Abdomen.  Soft nontender bowel sounds present.  Extremities.  No edema no calf tenderness. Left thumb bruise+  Neurological.  Awake and alert. No focal deficit.  Skin no rash. No pallor.  Psych. Normal mood.      Medical Decision Making       60 MINUTES SPENT BY ME on the date of service doing chart review, history, exam, documentation & further activities per the note.      Data     I have personally reviewed the following data over the past 24 hrs:    14.1 (H)  \   14.6   / 288     137 101 22.9 /  140 (H)   4.1 22 1.40 (H) \     INR:  1.04 PTT:  22   D-dimer:  N/A Fibrinogen:  N/A       Imaging results reviewed over the past 24 hrs:   Recent Results (from the past 24 hour(s))   XR Chest 2 Views    Narrative    EXAM: XR CHEST 2 VIEWS  LOCATION: [facility]  DATE: 9/22/2023    INDICATION: Lt lateral chest pain after fall.  COMPARISON: None.      Impression    IMPRESSION: Shallow inspiration. No focal  pulmonary consolidation. No pleural effusion. No pneumothorax. Heart size and pulmonary vascularity within normal limits.    CT Chest/Abdomen/Pelvis w Contrast    Narrative    EXAM: CT CHEST/ABDOMEN/PELVIS W CONTRAST  LOCATION: [facility]  DATE: 9/22/2023    INDICATION: trauma, fall, left sided pain  COMPARISON: None.  TECHNIQUE: CT scan of the chest, abdomen, and pelvis was performed following injection of IV contrast. Multiplanar reformats were obtained. Dose reduction techniques were used.   CONTRAST: 75 mL Isovue 370    FINDINGS:   LUNGS AND PLEURA: Bibasilar hypoventilatory changes without evidence of pulmonary laceration or contusion. No significant pleural effusion or pneumothorax.    MEDIASTINUM/AXILLAE: No evidence of acute injury. No suspicious lymphadenopathy.    CORONARY ARTERY CALCIFICATION: None.    HEPATOBILIARY: Hepatic steatosis. No evidence of acute injury.    PANCREAS: Normal.    SPLEEN: Normal.    ADRENAL GLANDS: Normal.    KIDNEYS/BLADDER: No hydronephrosis. Urinary bladder is unremarkable.    BOWEL: No obstruction or inflammatory change. No mesenteric hematoma or pneumoperitoneum.    LYMPH NODES: Normal.    VASCULATURE: No evidence of acute vascular injury or active hemorrhage.    PELVIC ORGANS: Mild prostatomegaly. Small fat-containing left inguinal hernia without evidence of acute complication.    MUSCULOSKELETAL: There are mildly displaced fractures of the left 8th through 12th ribs, 10th rib fracture is more displaced at the costochondral junction (series 4 image 164). No acute bony abnormality in the abdomen or pelvis.      Impression    IMPRESSION:  1.  Mildly displaced fractures of the left 8th through 12th ribs. No significant pleural effusion or pneumothorax.  2.  No evidence of acute trauma in the abdomen or pelvis.  3.  Hepatic steatosis.   Cervical spine CT w/o contrast    Narrative    EXAM: CT CERVICAL SPINE W/O CONTRAST  LOCATION: [facility]  DATE: 9/22/2023    INDICATION: Neck injury.  COMPARISON: None.  TECHNIQUE: Routine CT Cervical Spine without IV contrast. Multiplanar reformats. Dose reduction techniques were used.    FINDINGS:  VERTEBRA: Normal vertebral body heights, loss of normal lordosis. No fracture or posttraumatic subluxation.     CANAL/FORAMINA: At C4-C5, moderate right foraminal stenosis. At C5-C6 moderate bilateral foraminal stenosis, mild central stenosis. At C6-C7, moderate bilateral foraminal stenosis, mild central stenosis.    PARASPINAL: No extraspinal abnormality.      Impression    IMPRESSION:  1.  No fracture or posttraumatic subluxation.     XR Finger Left G/E 2 Views    Narrative    EXAM: XR FINGER LEFT G/E 2 VIEWS  LOCATION: [facility]  DATE: 9/22/2023    INDICATION: Pain after injury  COMPARISON: None.      Impression    IMPRESSION: The left thumb is negative for fracture or dislocation. Mild degenerative change at the IP joint of the left thumb.

## 2023-09-23 NOTE — PROGRESS NOTES
PRIMARY DIAGNOSIS: ACUTE PAIN  OUTPATIENT/OBSERVATION GOALS TO BE MET BEFORE DISCHARGE:  1. Pain Status: Improved-controlled with oral pain medications.    2. Return to near baseline physical activity: No    3. Cleared for discharge by consultants (if involved): No    Discharge Planner Nurse   Safe discharge environment identified: Yes  Barriers to discharge: Yes       Entered by: Mariah Coulter RN 09/23/2023 8:14 AM     Please review provider order for any additional goals.   Nurse to notify provider when observation goals have been met and patient is ready for discharge.

## 2023-09-23 NOTE — PROGRESS NOTES
PRIMARY DIAGNOSIS: ACUTE PAIN  OUTPATIENT/OBSERVATION GOALS TO BE MET BEFORE DISCHARGE:  1. Pain Status: Improved-controlled with oral pain medications.    2. Return to near baseline physical activity: No    3. Cleared for discharge by consultants (if involved): No    Discharge Planner Nurse   Safe discharge environment identified: Yes  Barriers to discharge: Yes       Entered by: Mariah Coulter RN 09/23/2023 12:56 PM     Please review provider order for any additional goals.   Nurse to notify provider when observation goals have been met and patient is ready for discharge.

## 2023-09-23 NOTE — PROGRESS NOTES
09/23/23 1445   Appointment Info   Signing Clinician's Name / Credentials (PT) Yasmine Bejarano PT   Rehab Comments (PT) Patient in bed and returned to bed after PT   Quick Adds   Quick Adds Certification       Present yes   Language Thai  (pt understands and apeaks some English, via Voicera)   Living Environment   People in Home child(heri), adult   Current Living Arrangements house   Home Accessibility stairs to enter home   Number of Stairs, Main Entrance 3   Stair Railings, Main Entrance none   Number of Stairs, Within Home, Primary greater than 10 stairs  (lives in a basement)   Stair Railings, Within Home, Primary railings safe and in good condition   Transportation Anticipated family or friend will provide   Living Environment Comments can stay on one level   Self-Care   Usual Activity Tolerance good   Current Activity Tolerance poor   Equipment Currently Used at Home none   Fall history within last six months no   Activity/Exercise/Self-Care Comment Patient independent with mobility and ADL   General Information   Onset of Illness/Injury or Date of Surgery 09/22/23   Referring Physician Tushar Maguire   Patient/Family Therapy Goals Statement (PT) did not state   Pertinent History of Current Problem (include personal factors and/or comorbidities that impact the POC) fall from ladder at home with rib fx 8-12   Existing Precautions/Restrictions fall   Weight-Bearing Status - LLE weight-bearing as tolerated   Weight-Bearing Status - RLE weight-bearing as tolerated   Cognition   Affect/Mental Status (Cognition) WFL   Orientation Status (Cognition) oriented x 4   Pain Assessment   Patient Currently in Pain Yes, see Vital Sign flowsheet   Range of Motion (ROM)   Range of Motion ROM is WFL   Strength (Manual Muscle Testing)   Strength (Manual Muscle Testing) Deficits observed during functional mobility   Strength Comments due to pain   Bed Mobility   Supine-Sit [place] (Bed  Mobility) moderate assist (50% patient effort)   Sit-Supine [place] (Bed Mobility) moderate assist (50% patient effort)   Bed Mobility Limitations decreased ability to use arms for pushing/pulling   Impairments Contributing to Impaired Bed Mobility pain   Assistive Device (Bed Mobility) bed rails   Comment, (Bed Mobility) moves slow   Sit-Stand Transfer   Sit-Stand [place] (Transfers) minimum assist (75% patient effort);verbal cues   Assistive Device (Sit-Stand Transfers) walker, front-wheeled   Comment, (Sit-Stand Transfer) cues for safety   Stand-Sit Transfer   Stand-Sit [place] (Transfers) minimum assist (75% patient effort);verbal cues   Assistive Device (Stand-Sit Transfers) walker, front-wheeled   Comment, (Stand-Sit Transfer) cues for dafety   Gait/Stairs (Locomotion)   [place] Level (Gait) minimum assist (75% patient effort)   Assistive Device (Gait) walker, front-wheeled   Distance in Feet 10 feet   Distance in Feet (Gait) 30 fet   Pattern (Gait) swing-through   Deviations/Abnormal Patterns (Gait) base of support, narrow;kylie decreased;festinating/shuffling;gait speed decreased;stride length decreased;weight shifting decreased   Maintains Weight-bearing Status (Gait) able to maintain   Clinical Impression   Criteria for Skilled Therapeutic Intervention Yes, treatment indicated   PT Diagnosis (PT) impaired functional mobility   Influenced by the following impairments pain   Functional limitations due to impairments bed mobility ,transfers,gait   Clinical Presentation (PT Evaluation Complexity) Stable/Uncomplicated   Clinical Presentation Rationale pt presesnts as med diagnosed   Clinical Decision Making (Complexity) low complexity   Planned Therapy Interventions (PT) bed mobility training;gait training;stair training;transfer training   Anticipated Equipment Needs at Discharge (PT) walker, rolling   Risk & Benefits of therapy have been explained evaluation/treatment results  reviewed;patient   Clinical Impression Comments Patient is a 63 yo [place] Island[place] speaking male admitted after fall and ribs hx.Presents mobility deficits and difficulty with  amb due to pain .Appropriate for skilled PT to improve functional mobility.Has 3 steps to get into house with no rails and a flight inside.   PT Total Evaluation Time   PT Eval, Low Complexity Minutes (26195) 12   Therapy Certification   Start of care date 09/23/23   Certification date from 09/23/23   Certification date to 09/30/23   Medical Diagnosis ribs fx   Physical Therapy Goals   PT Frequency Daily   PT Predicted Duration/Target Date for Goal Attainment 09/30/23   PT Goals Bed Mobility;Transfers;Gait;Stairs   PT: Bed Mobility Supervision/stand-by assist;Supine to/from sit   PT: Transfers Supervision/stand-by assist;Assistive device;Sit to/from stand   PT: Gait Supervision/stand-by assist;Assistive device;Rolling walker;150 feet   PT: Stairs Supervision/stand-by assist;10 stairs;Rail on left   Gait Training   Gait Training Minutes (11268) 12   Symptoms Noted During/After Treatment (Gait Training) increased pain   Treatment Detail/Skilled Intervention slow pace,small steps   [place] Level (Gait Training) contact guard   Physical Assistance Level (Gait Training) supervision;1 person assist   Weight Bearing (Gait Training) full weight-bearing   Assistive Device (Gait Training) rolling walker   Pattern Analysis (Gait Training) swing-through gait   Gait Analysis Deviations decreased kylie;decreased step length;decreased stride length;decreased toe-to-floor clearance;decreased weight-shifting ability   Impairments (Gait Analysis/Training) pain   PT Discharge Planning   PT Plan moobilize as able,distance amb ,stairs as able   PT Discharge Recommendation (DC Rec) Transitional Care Facility   PT Rationale for DC Rec Needs hands on for mobility and amb   PT Brief overview of current status mod assist for bed mobility ,min sit -stand with FWW.Amb 30  feet with FWW ,CGA   [facility]  OUTPATIENT PHYSICAL THERAPY EVALUATION  PLAN OF TREATMENT FOR OUTPATIENT REHABILITATION  (COMPLETE FOR INITIAL CLAIMS ONLY)  Patient's Last Name, First Name, M.I.  YOB: 1960  KhanJosias mondragon                           Provider's Name  [facility] Medical Record No.  [id number]                             Onset Date:  (P) 09/22/23   Start of Care Date:  (P) 09/23/23   Type:     _X_PT   ___OT   ___SLP Medical Diagnosis:  (P) ribs fx              PT Diagnosis:  (P) impaired functional mobility Visits from SOC:  1     See note for plan of treatment, functional goals and certification details    I CERTIFY THE NEED FOR THESE SERVICES FURNISHED UNDER        THIS PLAN OF TREATMENT AND WHILE UNDER MY CARE     (Physician co-signature of this document indicates review and certification of the therapy plan).

## 2023-09-23 NOTE — ED NOTES
"[facility] ED Handoff Report    ED Chief Complaint: Left 8th-12th rib fractures from fall    ED Diagnosis:  (S22.42XA) Closed fracture of multiple ribs of left side, initial encounter  Comment:   Plan: observation    (W19.XXXA) Fall, initial encounter  Comment:   Plan: observation    (S60.012A) Contusion of left thumb without damage to nail, initial encounter  Comment:   Plan: observation       PMH:  No past medical history on file.     Code Status:  No Order     Falls Risk: Yes Band: Applied    Current Living Situation/Residence: lives with a significant other and lives in a house     Elimination Status: Continent: Yes     Activity Level: SBA w/ walker    Patients Preferred Language:  Other: Cameroonian     Needed: Yes    Vital Signs:  BP (!) 145/87   Pulse 61   Temp 97.9  F (36.6  C)   Resp 19   Ht 1.727 m (5' 8\")   Wt 85.3 kg (188 lb)   SpO2 94%   BMI 28.59 kg/m       Cardiac Rhythm:     Pain Score: 0/10 while laying, 10/10 with movement    Is the Patient Confused:  No    Last Food or Drink: 09/22/23    Focused Assessment:  Patient alert and oriented, reports left sided pain worse with movement, denies current pain as he is laying down. Patient was on 3.5 foot ladder when he fell and hit side into counter.    Tests Performed: Done: Labs and Imaging    Treatments Provided:  See MAR    Family Dynamics/Concerns: No    Family Updated On Visitor Policy: Yes    Plan of Care Communicated to Family: Yes    Who Was Updated about Plan of Care: spouse and daughter at bedside    Belongings Checklist Done and Signed by Patient: Yes    Medications sent with patient: n/a    Covid: asymptomatic , negative    Additional Information:     RN: Shania Lemus RN 9/22/2023 8:22 PM       "

## 2023-09-23 NOTE — ED NOTES
Patient had 1 episode of emesis and reports having nausea. Message sent to Dr. Ji for zofran order if appropriate.

## 2023-09-23 NOTE — PROGRESS NOTES
PRIMARY DIAGNOSIS: TRAUMA MANAGEMENT    OUTPATIENT/OBSERVATION GOALS TO BE MET BEFORE DISCHARGE    Acute Traumatic Pain     1.  Pain controlled with oral analgesia: Yes      2.  Vital signs stable: Yes      3.  Diagnotic testing complete: Yes      4.  Cleared from consultants (if applicable): No      5. Return to near baseline physical activity: No  Discharge Planner Nurse   Safe discharge environment identified: Yes  Barriers to discharge: Yes       Entered by: Anthony Coello RN 09/23/2023 5:04 AM     Pain rating 3/10. Controlled with oxy.

## 2023-09-23 NOTE — PROVIDER NOTIFICATION
Around 2200 we got a call from the bed board stating that Dr. Anthony from the [facility] wanted to have Josias transferred to the [facility].  Neither the nurse or the charge were aware of the transfer.  Dr. Ji was called and he had no intention of having the patient transferred.  He consulted trauma per protocol. Will address in am.  Pt is stable.

## 2023-09-23 NOTE — ED NOTES
" ED Handoff Report    ED Chief Complaint: Left 8th-12th rib fractures from fall    ED Diagnosis:  (S22.42XA) Closed fracture of multiple ribs of left side, initial encounter  Comment:   Plan: observation    (W19.XXXA) Fall, initial encounter  Comment:   Plan: observation    (S60.012A) Contusion of left thumb without damage to nail, initial encounter  Comment:   Plan: observation       PMH:  No past medical history on file.     Code Status:  No Order     Falls Risk: Yes Band: Applied    Current Living Situation/Residence: lives with a significant other and lives in a house     Elimination Status: Continent: Yes     Activity Level: SBA w/ walker    Patients Preferred Language:  Other: Bermudian     Needed: Yes    Vital Signs:  BP (!) 145/87   Pulse 61   Temp 97.9  F (36.6  C)   Resp 19   Ht 1.727 m (5' 8\")   Wt 85.3 kg (188 lb)   SpO2 94%   BMI 28.59 kg/m       Cardiac Rhythm:     Pain Score: 0/10 while laying, 10/10 with movement    Is the Patient Confused:  No    Last Food or Drink: 09/22/23    Focused Assessment:  Patient alert and oriented, reports left sided pain worse with movement, denies current pain as he is laying down. Patient was on 3.5 foot ladder when he fell and hit side into counter. Patient reported 1 episode of emesis, PRN given.    Tests Performed: Done: Labs and Imaging    Treatments Provided:  See MAR    Family Dynamics/Concerns: No    Family Updated On Visitor Policy: Yes    Plan of Care Communicated to Family: Yes    Who Was Updated about Plan of Care: spouse and daughter at bedside    Belongings Checklist Done and Signed by Patient: Yes    Medications sent with patient: n/a    Covid: asymptomatic , negative    Additional Information:     RN: Shaina Lemus RN 9/22/2023 9:22 PM        "

## 2023-09-23 NOTE — MEDICATION SCRIBE - ADMISSION MEDICATION HISTORY
Medication Scribe Admission Medication History    Admission medication history is complete. The information provided in this note is only as accurate as the sources available at the time of the update.    Medication reconciliation/reorder completed by provider prior to medication history? No    Information Source(s): Patient via in-person    Pertinent Information: Patient's daughter was used as  for interview.    Patient has zero medications in PTA list prior to interview.     Patient reports picking up, but not yet starting, Rosuvastatin.     Changes made to PTA medication list:  Added: All  Deleted: None  Changed: None    Medication Affordability: No       Allergies reviewed with patient and updates made in EHR: yes    Medication History Completed By: Jorge Valentin 9/22/2023 8:55 PM    Prior to Admission medications    Medication Sig Last Dose Taking? Auth Provider Long Term End Date   acetaminophen (TYLENOL) 500 MG tablet Take 500-1,000 mg by mouth every 8 hours as needed for pain Past Week at prn Yes Reported, Patient     albuterol (PROAIR HFA/PROVENTIL HFA/VENTOLIN HFA) 108 (90 Base) MCG/ACT inhaler Inhale 1-2 puffs into the lungs every 4 hours as needed for shortness of breath or wheezing Past Week at prn Yes Reported, Patient Yes    lisinopril (ZESTRIL) 5 MG tablet Take 1 tablet by mouth daily 9/21/2023 at am Yes Reported, Patient Yes    metFORMIN (GLUCOPHAGE XR) 500 MG 24 hr tablet Take 1,000 mg by mouth 2 times daily (with meals) 9/21/2023 at pm Yes Reported, Patient Yes    omeprazole (PRILOSEC) 20 MG DR capsule Take 20 mg by mouth daily as needed Past Week at prn Yes Reported, Patient     oxyBUTYnin ER (DITROPAN XL) 15 MG 24 hr tablet Take 15 mg by mouth daily 9/21/2023 at am Yes Reported, Patient     rosuvastatin (CRESTOR) 5 MG tablet Take 5 mg by mouth every 7 days   Reported, Patient Yes

## 2023-09-24 ENCOUNTER — APPOINTMENT (OUTPATIENT)
Dept: OCCUPATIONAL THERAPY | Facility: HOSPITAL | Age: 63
End: 2023-09-24
Attending: INTERNAL MEDICINE
Payer: MEDICARE

## 2023-09-24 ENCOUNTER — APPOINTMENT (OUTPATIENT)
Dept: PHYSICAL THERAPY | Facility: HOSPITAL | Age: 63
End: 2023-09-24
Payer: MEDICARE

## 2023-09-24 VITALS
SYSTOLIC BLOOD PRESSURE: 139 MMHG | DIASTOLIC BLOOD PRESSURE: 87 MMHG | BODY MASS INDEX: 28.37 KG/M2 | HEART RATE: 65 BPM | WEIGHT: 187.17 LBS | TEMPERATURE: 98.5 F | RESPIRATION RATE: 18 BRPM | OXYGEN SATURATION: 93 % | HEIGHT: 68 IN

## 2023-09-24 LAB
ALBUMIN SERPL BCG-MCNC: 4.1 G/DL (ref 3.5–5.2)
ALP SERPL-CCNC: 40 U/L (ref 40–129)
ALT SERPL W P-5'-P-CCNC: 66 U/L (ref 0–70)
ANION GAP SERPL CALCULATED.3IONS-SCNC: 11 MMOL/L (ref 7–15)
AST SERPL W P-5'-P-CCNC: 37 U/L (ref 0–45)
BASOPHILS # BLD AUTO: 0.1 10E3/UL (ref 0–0.2)
BASOPHILS NFR BLD AUTO: 1 %
BILIRUB SERPL-MCNC: 0.5 MG/DL
BUN SERPL-MCNC: 16.4 MG/DL (ref 8–23)
CALCIUM SERPL-MCNC: 8.8 MG/DL (ref 8.8–10.2)
CHLORIDE SERPL-SCNC: 102 MMOL/L (ref 98–107)
CREAT SERPL-MCNC: 1.2 MG/DL (ref 0.67–1.17)
DEPRECATED HCO3 PLAS-SCNC: 21 MMOL/L (ref 22–29)
EGFRCR SERPLBLD CKD-EPI 2021: 68 ML/MIN/1.73M2
EOSINOPHIL # BLD AUTO: 0.2 10E3/UL (ref 0–0.7)
EOSINOPHIL NFR BLD AUTO: 3 %
ERYTHROCYTE [DISTWIDTH] IN BLOOD BY AUTOMATED COUNT: 13.2 % (ref 10–15)
GLUCOSE BLDC GLUCOMTR-MCNC: 106 MG/DL (ref 70–99)
GLUCOSE BLDC GLUCOMTR-MCNC: 132 MG/DL (ref 70–99)
GLUCOSE BLDC GLUCOMTR-MCNC: 148 MG/DL (ref 70–99)
GLUCOSE SERPL-MCNC: 123 MG/DL (ref 70–99)
HCT VFR BLD AUTO: 41.5 % (ref 40–53)
HGB BLD-MCNC: 14.2 G/DL (ref 13.3–17.7)
IMM GRANULOCYTES # BLD: 0 10E3/UL
IMM GRANULOCYTES NFR BLD: 0 %
LYMPHOCYTES # BLD AUTO: 2.2 10E3/UL (ref 0.8–5.3)
LYMPHOCYTES NFR BLD AUTO: 31 %
MCH RBC QN AUTO: 29.3 PG (ref 26.5–33)
MCHC RBC AUTO-ENTMCNC: 34.2 G/DL (ref 31.5–36.5)
MCV RBC AUTO: 86 FL (ref 78–100)
MONOCYTES # BLD AUTO: 0.6 10E3/UL (ref 0–1.3)
MONOCYTES NFR BLD AUTO: 8 %
NEUTROPHILS # BLD AUTO: 4 10E3/UL (ref 1.6–8.3)
NEUTROPHILS NFR BLD AUTO: 57 %
NRBC # BLD AUTO: 0 10E3/UL
NRBC BLD AUTO-RTO: 0 /100
PLATELET # BLD AUTO: 270 10E3/UL (ref 150–450)
POTASSIUM SERPL-SCNC: 4.1 MMOL/L (ref 3.4–5.3)
PROT SERPL-MCNC: 7.2 G/DL (ref 6.4–8.3)
RBC # BLD AUTO: 4.85 10E6/UL (ref 4.4–5.9)
SODIUM SERPL-SCNC: 134 MMOL/L (ref 136–145)
WBC # BLD AUTO: 7.1 10E3/UL (ref 4–11)

## 2023-09-24 PROCEDURE — 82962 GLUCOSE BLOOD TEST: CPT

## 2023-09-24 PROCEDURE — 80053 COMPREHEN METABOLIC PANEL: CPT | Performed by: INTERNAL MEDICINE

## 2023-09-24 PROCEDURE — 250N000013 HC RX MED GY IP 250 OP 250 PS 637: Performed by: INTERNAL MEDICINE

## 2023-09-24 PROCEDURE — 97530 THERAPEUTIC ACTIVITIES: CPT | Mod: GP

## 2023-09-24 PROCEDURE — 99238 HOSP IP/OBS DSCHRG MGMT 30/<: CPT | Performed by: INTERNAL MEDICINE

## 2023-09-24 PROCEDURE — 97166 OT EVAL MOD COMPLEX 45 MIN: CPT | Mod: GO

## 2023-09-24 PROCEDURE — 97535 SELF CARE MNGMENT TRAINING: CPT | Mod: GO

## 2023-09-24 PROCEDURE — 97116 GAIT TRAINING THERAPY: CPT | Mod: GP

## 2023-09-24 PROCEDURE — 99231 SBSQ HOSP IP/OBS SF/LOW 25: CPT | Performed by: SURGERY

## 2023-09-24 PROCEDURE — G0378 HOSPITAL OBSERVATION PER HR: HCPCS

## 2023-09-24 PROCEDURE — 85025 COMPLETE CBC W/AUTO DIFF WBC: CPT | Performed by: INTERNAL MEDICINE

## 2023-09-24 PROCEDURE — 36415 COLL VENOUS BLD VENIPUNCTURE: CPT | Performed by: INTERNAL MEDICINE

## 2023-09-24 RX ORDER — OXYCODONE HYDROCHLORIDE 5 MG/1
5 TABLET ORAL EVERY 6 HOURS PRN
Qty: 15 TABLET | Refills: 0 | Status: SHIPPED | OUTPATIENT
Start: 2023-09-24

## 2023-09-24 RX ORDER — LIDOCAINE 50 MG/G
OINTMENT TOPICAL 4 TIMES DAILY
Qty: 50 G | Refills: 0 | Status: SHIPPED | OUTPATIENT
Start: 2023-09-24

## 2023-09-24 RX ORDER — METFORMIN HCL 500 MG
1000 TABLET, EXTENDED RELEASE 24 HR ORAL 2 TIMES DAILY WITH MEALS
Start: 2023-09-26

## 2023-09-24 RX ADMIN — SENNOSIDES AND DOCUSATE SODIUM 2 TABLET: 50; 8.6 TABLET ORAL at 08:13

## 2023-09-24 RX ADMIN — OXYCODONE HYDROCHLORIDE 2.5 MG: 5 TABLET ORAL at 08:13

## 2023-09-24 RX ADMIN — OXYCODONE HYDROCHLORIDE 2.5 MG: 5 TABLET ORAL at 18:20

## 2023-09-24 RX ADMIN — ACETAMINOPHEN 975 MG: 325 TABLET ORAL at 08:12

## 2023-09-24 RX ADMIN — OXYCODONE HYDROCHLORIDE 2.5 MG: 5 TABLET ORAL at 02:41

## 2023-09-24 RX ADMIN — LIDOCAINE: 50 OINTMENT TOPICAL at 13:36

## 2023-09-24 RX ADMIN — ACETAMINOPHEN 975 MG: 325 TABLET ORAL at 02:41

## 2023-09-24 RX ADMIN — ACETAMINOPHEN 975 MG: 325 TABLET ORAL at 17:16

## 2023-09-24 RX ADMIN — LIDOCAINE: 50 OINTMENT TOPICAL at 08:16

## 2023-09-24 RX ADMIN — LIDOCAINE: 50 OINTMENT TOPICAL at 17:17

## 2023-09-24 ASSESSMENT — ACTIVITIES OF DAILY LIVING (ADL)
ADLS_ACUITY_SCORE: 32

## 2023-09-24 NOTE — PLAN OF CARE
Goal Outcome Evaluation:               Pt's pain was managed with both scheduled Tylenol, lidocaine cream and prn Oxy with some effects. Went through discharge instructions with Pt and his daughter at bedside. Pt and DRT bot stated understanding of the discharge instructions. Pt was given prn Oxy for pain at the point of discharge. Pt was then wheeled out to his Drt's waiting car.

## 2023-09-24 NOTE — PROGRESS NOTES
"PRIMARY DIAGNOSIS: \"GENERIC\" NURSING  OUTPATIENT/OBSERVATION GOALS TO BE MET BEFORE DISCHARGE:  ADLs back to baseline: Yes    Activity and level of assistance: Up with standby assistance.    Pain status: Improved-controlled with oral pain medications.    Return to near baseline physical activity: No     Discharge Planner Nurse   Safe discharge environment identified: No  Barriers to discharge: Yes recommending TCU       Entered by: Tiarra Velazquez RN 09/24/2023 4:37 AM     Please review provider order for any additional goals.   Nurse to notify provider when observation goals have been met and patient is ready for discharge.  "

## 2023-09-24 NOTE — PLAN OF CARE
Problem: Plan of Care - These are the overarching goals to be used throughout the patient stay.    Goal: Optimal Comfort and Wellbeing  Outcome: Progressing     Problem: Pain Acute  Goal: Optimal Pain Control and Function  Outcome: Progressing     Problem: Fall Injury Risk  Goal: Absence of Fall and Fall-Related Injury  Outcome: Met  Intervention: Promote Injury-Free Environment  Recent Flowsheet Documentation  Taken 9/24/2023 0000 by Tiarra Velazquez, RN  Safety Promotion/Fall Prevention:   activity supervised   nonskid shoes/slippers when out of bed  Taken 9/23/2023 2000 by Tiarra Velazquez, RN  Safety Promotion/Fall Prevention:   activity supervised   nonskid shoes/slippers when out of bed   Goal Outcome Evaluation: prn oxy and scheduled tylenol and lidocaine given overnight. No acute events overnight. Pt able to make needs known.

## 2023-09-24 NOTE — PROGRESS NOTES
General Surgery Progress Note  Hospital Day # 2 status post a fall from a ladder resulting in multiple rib fractures on the left side but no pneumothorax.    Subjective:   Pt is feeling better.  He continues to have pain when he is up walking around but is fairly comfortable when he is lying still.    Vitals:    09/23/23 1521 09/23/23 1915 09/23/23 2322 09/24/23 0734   BP: 135/76 135/76 134/82 (!) 143/82   BP Location: Left arm Left arm Left arm Right arm   Pulse: 63 63 54 59   Resp: 18 18 18 18   Temp: 98.6  F (37  C) 98.6  F (37  C) 98.5  F (36.9  C) 98.2  F (36.8  C)   TempSrc: Oral Oral Oral Oral   SpO2: 93% 94% 94% 93%   Weight:       Height:           Physical Exam:  Lungs:  CTA  CV:       RRR  Ab:       Soft, + BS,     Recent Labs   Lab 09/24/23  0712   WBC 7.1   HGB 14.2   HCT 41.5          Recent Labs   Lab 09/24/23  0712   *   CO2 21*   BUN 16.4   ALBUMIN 4.1   ALKPHOS 40   ALT 66   AST 37       Assessment:  Pt is progressing well status post a fall from a ladder resulting in left-sided rib fractures and a left thumb injury.  Patient seems to be managing these injuries well and I anticipate full recovery with time.    Plan: Trauma surgery will sign off,  Please contact us if we can be of further assistance.    Melvin Cordova MD  [facility]  [phone]

## 2023-09-24 NOTE — PROGRESS NOTES
PRIMARY DIAGNOSIS: ACUTE PAIN  OUTPATIENT/OBSERVATION GOALS TO BE MET BEFORE DISCHARGE:  1. Pain Status: Improved-controlled with oral pain medications.    2. Return to near baseline physical activity: Yes    3. Cleared for discharge by consultants (if involved): No    Discharge Planner Nurse   Safe discharge environment identified: No  Barriers to discharge: Yes       Entered by: Mariah Coulter RN 09/24/2023 8:11 AM     Please review provider order for any additional goals.   Nurse to notify provider when observation goals have been met and patient is ready for discharge.

## 2023-09-24 NOTE — PROGRESS NOTES
PRIMARY DIAGNOSIS: ACUTE PAIN  OUTPATIENT/OBSERVATION GOALS TO BE MET BEFORE DISCHARGE:  1. Pain Status: Improved-controlled with oral pain medications.    2. Return to near baseline physical activity: Yes    3. Cleared for discharge by consultants (if involved): No    Discharge Planner Nurse   Safe discharge environment identified: Yes  Barriers to discharge: No       Entered by: Mariah Coulter RN 09/24/2023 12:03 PM     Please review provider order for any additional goals.   Nurse to notify provider when observation goals have been met and patient is ready for discharge.

## 2023-09-24 NOTE — PROGRESS NOTES
"PRIMARY DIAGNOSIS: \"GENERIC\" NURSING  OUTPATIENT/OBSERVATION GOALS TO BE MET BEFORE DISCHARGE:  ADLs back to baseline: Yes    Activity and level of assistance: up with SBA     Pain status: Improved-controlled with oral pain medications.    Return to near baseline physical activity: No     Discharge Planner Nurse   Safe discharge environment identified: No  Barriers to discharge: Yes needs tcu       Entered by: Tiarra Velazquez RN 09/24/2023 1:50 AM     Please review provider order for any additional goals.   Nurse to notify provider when observation goals have been met and patient is ready for discharge.  "

## 2023-09-24 NOTE — PROGRESS NOTES
[facility]  OUTPATIENT OCCUPATIONAL THERAPY  EVALUATION  PLAN OF TREATMENT FOR OUTPATIENT REHABILITATION  (COMPLETE FOR INITIAL CLAIMS ONLY)  Patient's Last Name, First Name, M.I.  YOB: 1960  BillJosias                             Provider's Name  [facility] Medical Record No.  [id number]                             Onset Date:  09/22/23   Start of Care Date:  09/22/23   Type:     ___PT   _X_OT   ___SLP Medical Diagnosis:  contusion of thumb, rib fractures                    OT Diagnosis:  decresaed ADLs Visits from SOC:  1     See note for plan of treatment, functional goals and certification details    I CERTIFY THE NEED FOR THESE SERVICES FURNISHED UNDER        THIS PLAN OF TREATMENT AND WHILE UNDER MY CARE     (Physician co-signature of this document indicates review and certification of the therapy plan).                                09/24/23 9906   Appointment Info   Signing Clinician's Name / Credentials (OT) Mary Reyes, OTR/L   Quick Adds   Quick Adds Certification       Present yes   Language Malaysian   Living Environment   People in Home child(heri), adult   Current Living Arrangements house   Home Accessibility stairs to enter home   Number of Stairs, Main Entrance 3   Stair Railings, Main Entrance none   Number of Stairs, Within Home, Primary greater than 10 stairs  (downstairs)   Stair Railings, Within Home, Primary railings safe and in good condition   Transportation Anticipated family or friend will provide   Living Environment Comments can stay on 1 level   Self-Care   Usual Activity Tolerance good   Current Activity Tolerance fair   Equipment Currently Used at Home none   Fall history within last six months yes   Number of times patient has fallen within last six months   (number unkown)   Instrumental Activities of Daily Living (IADL)   IADL Comments reports indep. all ADLs, does not work    General Information   Onset of Illness/Injury or Date of Surgery 09/22/23   Referring Physician Dr. Tushar Maguire   Additional Occupational Profile Info/Pertinent History of Current Problem Josias Khan is a 62 year old male admitted on 9/22/2023. He presents with fall off ladder. Found to have ribs fractures.   Existing Precautions/Restrictions fall   Cognitive Status Examination   Orientation Status orientation to person, place and time   Visual Perception   Visual Impairment/Limitations WFL   Range of Motion Comprehensive   General Range of Motion   (WFL for ADLs)   Strength Comprehensive (MMT)   General Manual Muscle Testing (MMT) Assessment no strength deficits identified   Coordination   Upper Extremity Coordination No deficits were identified   Bed Mobility   Bed Mobility supine-sit   Supine-Sit [place] (Bed Mobility) minimum assist (75% patient effort)   Transfers   Transfers toilet transfer;bed-chair transfer   Transfer Skill: Bed to Chair/Chair to Bed   Bed-Chair [place] (Transfers) minimum assist (75% patient effort)   Toilet Transfer   Type (Toilet Transfer) sit-stand;stand-sit   [place] Level (Toilet Transfer) minimum assist (75% patient effort)   Balance   Balance Assessment standing balance: dynamic   Balance Comments WFL   Activities of Daily Living   BADL Assessment/Intervention grooming;lower body dressing   Lower Body Dressing Assessment/Training   [place] Level (Lower Body Dressing) unable to perform   Grooming Assessment/Training   [place] Level (Grooming) supervision   Clinical Impression   Criteria for Skilled Therapeutic Interventions Met (OT) Yes, treatment indicated   OT Diagnosis decresaed ADLs   OT Problem List-Impairments impacting ADL activity tolerance impaired;mobility;flexibility;pain   Assessment of Occupational Performance 3-5 Performance Deficits   Identified Performance Deficits trsfs, bed mobility, drsg, g/h,   Planned Therapy Interventions (OT) ADL  retraining;transfer training   Clinical Decision Making Complexity (OT) moderate complexity   Anticipated Equipment Needs Upon Discharge (OT) walker, rolling   Risk & Benefits of therapy have been explained evaluation/treatment results reviewed;care plan/treatment goals reviewed;patient   OT Total Evaluation Time   OT Eval, Moderate Complexity Minutes (42424) 10   Therapy Certification   Medical Diagnosis contusion of thumb, rib fractures   Start of Care Date 09/22/23   Certification date from 09/22/23   Certification date to 09/29/23   OT Goals   Therapy Frequency (OT) Daily   OT Predicted Duration/Target Date for Goal Attainment 10/01/23   OT Goals Lower Body Dressing;Transfers   OT: Lower Body Dressing Supervision/stand-by assist;using adaptive equipment   OT: Transfer Supervision/stand-by assist;with assistive device   Interventions   Interventions Quick Adds Self-Care/Home Management   Self-Care/Home Management   Self-Care/Home Mgmt/ADL, Compensatory, Meal Prep Minutes (46118) 15   Symptoms Noted During/After Treatment (Meal Preparation/Planning Training) increased pain   Treatment Detail/Skilled Intervention pt agreeable to OT via , supine>sit w/ semi logroll technique used w/ min A, cues for bedrail use, trsf bed>chair>toilet(commode in BR) w/ min A w/ FWW, cues for hand placement, increased pain w/ STS especially, gave strategies for pain such as holding pillow while coughing, pt in chair w/ seat alarm on   [place] Level (Grooming Training) stand-by assist  (stood at sink)   Assistance (Grooming Training) set-up required;supervision   Lower Body Dressing Training Assistance other (see comments)  (attempted to reach feet using fig. 4 technique but unable,)   OT Discharge Planning   OT Plan bed mobility, trsfs w/ FWW, LE drsg trial w/ AE   OT Discharge Recommendation (DC Rec) home with home care occupational therapy;home with assist   OT Rationale for DC Rec A x1 for trsfs and ADLs, may benefit  from home OT eval, rec. A w/ drsg, set up for bathing and household tasks, per chart family can A as able   OT Brief overview of current status min A bed mobility, trsfs min A, SBA g/h,   Total Session Time   Timed Code Treatment Minutes 15   Total Session Time (sum of timed and untimed services) 25

## 2023-09-24 NOTE — PROGRESS NOTES
Care Management Discharge Note    Discharge Date: 09/24/2023       Discharge Disposition: Home    Discharge Services:      Discharge DME:      Discharge Transportation: family or friend will provide    Private pay costs discussed: Not applicable    Does the patient's insurance plan have a 3 day qualifying hospital stay waiver?  No   Will the waiver be used for post-acute placement? No    PAS Confirmation Code:    Patient/family educated on Medicare website which has current facility and service quality ratings:      Education Provided on the Discharge Plan:    Persons Notified of Discharge Plans: patient   Patient/Family in Agreement with the Plan:      Handoff Referral Completed: Yes    Additional Information:  See below     Terri Hurtado RN        Chart reviewed. Therapy changed rec this morning from TCU to home with assist. No CM needs anticipated

## 2023-09-24 NOTE — PLAN OF CARE
Occupational Therapy Discharge Summary    Reason for therapy discharge:    Discharged to home.    Progress towards therapy goal(s). See goals on Care Plan in [facility] electronic health record for goal details.  Goals not met.  Barriers to achieving goals:   discharge from facility.    Therapy recommendation(s):    Laureen benefit from home OT evaluation    Goal Outcome Evaluation:         Laureen Reyes, OTR/L  9/24/2023

## 2023-09-24 NOTE — DISCHARGE SUMMARY
"[facility]  Hospitalist Discharge Summary      Date of Admission:  9/22/2023  Date of Discharge:  9/24/2023  Discharging Provider: Tushar Maguire DO, DO  Discharge Service: Hospitalist Service    Discharge Diagnoses       Clinically Significant Risk Factors     # DMII: A1C = 6.9 % (Ref range: <5.7 %) within past 6 months  # Overweight: Estimated body mass index is 28.46 kg/m  as calculated from the following:    Height as of this encounter: 1.727 m (5' 8\").    Weight as of this encounter: 84.9 kg (187 lb 2.7 oz).       Follow-ups Needed After Discharge   Follow-up Appointments     Follow-up and recommended labs and tests       Follow up with primary care provider, John Gonzales, within 7 days for   hospital follow- up.  Recommend CBC, CMP be checked at follow-up.            Unresulted Labs Ordered in the Past 30 Days of this Admission       No orders found from 8/23/2023 to 9/23/2023.            Discharge Disposition   Discharged to home  Condition at discharge: Stable    Hospital Course   Josias Khan is a 62 year old male admitted on 9/22/2023. He presents with fall off ladder. Found to have ribs fracture. PMH: DM on Metformin, HTN on Lisinopril and Statin for HLD.      Fall off ladder 4 f above ground  Left ribs fractures 8-12 mildly displaced, no PTX  -pt denies head/neck injury. No LOC.  -chest ct: Mildly displaced fractures of the left 8th through 12th ribs. No significant pleural effusion or pneumothorax   -pain control, bronchial hygiene  -trauma surgeon consultation appreciated. Stable for discharge.  -mild left thumb injury with bruise; xray ok  -pt/ot cleared home discharge     EZIO or CKD, improved  -no previous record  -hold Lisinopril  -Bmp 1 week when seen by PCP in follow-up     DM 2 on Metformin  -hold metformin until 9/26/23.  PCP foollow-up  -stop Lantus   -sliding scale insulin ac tid, accuchecks ac/hs  -hold metformin  -hgb A1c     HTN: remains normotensive and " Cr slightly up at 1.2 so continue to hold lisinopril on discharge and f/unit(s) with PCP 1 week. Monitor BP's as instructed post discharge.     HLD     Neck discomfort  -CT showing no acute changes    Consultations This Hospital Stay   TRAUMA SURGERY IP CONSULT  TRAUMA SURGERY IP CONSULT  PHYSICAL THERAPY ADULT IP CONSULT  OCCUPATIONAL THERAPY ADULT IP CONSULT    Code Status   Full Code    Time Spent on this Encounter          Tushar MaguireDO,   [address] [address]  Phone: [phone]  Fax: [phone]  ______________________________________________________________________    Physical Exam   Vital Signs: Temp: 97.5  F (36.4  C) Temp src: Oral BP: (!) 131/95 Pulse: 59   Resp: 18 SpO2: 93 % O2 Device: None (Room air)    Weight: 187 lbs 2.73 oz    General appearance - alert, and in no distress  Lungs - clear to auscultation, no wheezes, rales or rhonchi, symmetric air entry  Heart - normal rate, regular rhythm, normal S1, S2, no murmurs, rubs, clicks or gallops. No peripheral edema.  Abdomen - soft, nontender, nondistended, no masses or organomegaly, BS+  Neurological - alert, oriented, normal speech, no focal findings or movement disorder noted  Skin - left rib cage area near known fx's ttp       Primary Care Physician   John Gonzales    Discharge Orders      Reason for your hospital stay    Fall, Rib Fractures     Follow-up and recommended labs and tests     Follow up with primary care provider, John Gonzales, within 7 days for hospital follow- up.  Recommend CBC, CMP be checked at follow-up.     Activity    Your activity upon discharge: activity as tolerated     Monitor and record    blood glucose 4 times a day, before meals and at bedtime.  If glucose > 250 and/or < 70 on 3 or more consecutive readings contact your primary care physician for further instructions/recommendations.    blood pressure daily and/or as needed if lightheaded,  dizzy, short of breath, chest pain.  If top number is > 170 or < 90 and/or bottom number > 100 on 3 or more consecutive readings contact your primary care physician for further instructions/recommendations.     Diet    Follow this diet upon discharge: Orders Placed This Encounter      Moderate Consistent Carb (60 g CHO per Meal) Diet       Significant Results and Procedures       Discharge Medications   Current Discharge Medication List        START taking these medications    Details   lidocaine (XYLOCAINE) 5 % external ointment Apply topically 4 times daily  Qty: 50 g, Refills: 0    Associated Diagnoses: Closed fracture of multiple ribs of left side, initial encounter      oxyCODONE (ROXICODONE) 5 MG tablet Take 1 tablet (5 mg) by mouth every 6 hours as needed for pain  Qty: 15 tablet, Refills: 0    Associated Diagnoses: Closed fracture of multiple ribs of left side, initial encounter           CONTINUE these medications which have CHANGED    Details   metFORMIN (GLUCOPHAGE XR) 500 MG 24 hr tablet Take 2 tablets (1,000 mg) by mouth 2 times daily (with meals)    Associated Diagnoses: Type 2 diabetes mellitus with other specified complication, without long-term current use of insulin (H)           CONTINUE these medications which have NOT CHANGED    Details   acetaminophen (TYLENOL) 500 MG tablet Take 500-1,000 mg by mouth every 8 hours as needed for pain      albuterol (PROAIR HFA/PROVENTIL HFA/VENTOLIN HFA) 108 (90 Base) MCG/ACT inhaler Inhale 1-2 puffs into the lungs every 4 hours as needed for shortness of breath or wheezing      omeprazole (PRILOSEC) 20 MG DR capsule Take 20 mg by mouth daily as needed      oxyBUTYnin ER (DITROPAN XL) 15 MG 24 hr tablet Take 15 mg by mouth daily      rosuvastatin (CRESTOR) 5 MG tablet Take 5 mg by mouth every 7 days           STOP taking these medications       lisinopril (ZESTRIL) 5 MG tablet Comments:   Reason for Stopping:             Allergies   Allergies   Allergen  Reactions    Ibuprofen Other (See Comments)     Pt reports advised to avoid by pcp r/t long term use leading to kidney impairment.

## 2023-09-24 NOTE — PLAN OF CARE
Physical Therapy Discharge Summary    Reason for therapy discharge:    Discharged to home.    Progress towards therapy goal(s). See goals on Care Plan in [facility] electronic health record for goal details.  Goals partially met.  Barriers to achieving goals:   discharge from facility.    Therapy recommendation(s):    No further therapy is recommended.